# Patient Record
Sex: FEMALE | Race: WHITE
[De-identification: names, ages, dates, MRNs, and addresses within clinical notes are randomized per-mention and may not be internally consistent; named-entity substitution may affect disease eponyms.]

---

## 2019-11-16 ENCOUNTER — HOSPITAL ENCOUNTER (INPATIENT)
Dept: HOSPITAL 97 - ER | Age: 64
LOS: 2 days | Discharge: HOME | DRG: 392 | End: 2019-11-18
Attending: FAMILY MEDICINE | Admitting: FAMILY MEDICINE
Payer: COMMERCIAL

## 2019-11-16 VITALS — BODY MASS INDEX: 24.7 KG/M2

## 2019-11-16 DIAGNOSIS — E78.5: ICD-10-CM

## 2019-11-16 DIAGNOSIS — K21.9: ICD-10-CM

## 2019-11-16 DIAGNOSIS — Z88.0: ICD-10-CM

## 2019-11-16 DIAGNOSIS — E03.9: ICD-10-CM

## 2019-11-16 DIAGNOSIS — R19.7: ICD-10-CM

## 2019-11-16 DIAGNOSIS — K57.92: Primary | ICD-10-CM

## 2019-11-16 LAB
ALBUMIN SERPL BCP-MCNC: 3.4 G/DL (ref 3.4–5)
ALP SERPL-CCNC: 73 U/L (ref 45–117)
ALT SERPL W P-5'-P-CCNC: 21 U/L (ref 12–78)
AST SERPL W P-5'-P-CCNC: 17 U/L (ref 15–37)
BUN BLD-MCNC: 12 MG/DL (ref 7–18)
GLUCOSE SERPLBLD-MCNC: 103 MG/DL (ref 74–106)
HCT VFR BLD CALC: 38.9 % (ref 36–45)
INR BLD: 1.11
LIPASE SERPL-CCNC: 111 U/L (ref 73–393)
LYMPHOCYTES # SPEC AUTO: 1.3 K/UL (ref 0.7–4.9)
MAGNESIUM SERPL-MCNC: 2.2 MG/DL (ref 1.8–2.4)
NT-PROBNP SERPL-MCNC: 57 PG/ML (ref ?–125)
PMV BLD: 10 FL (ref 7.6–11.3)
POTASSIUM SERPL-SCNC: 3.5 MMOL/L (ref 3.5–5.1)
RBC # BLD: 4.28 M/UL (ref 3.86–4.86)
TROPONIN (EMERG DEPT USE ONLY): < 0.02 NG/ML (ref 0–0.04)

## 2019-11-16 PROCEDURE — 84100 ASSAY OF PHOSPHORUS: CPT

## 2019-11-16 PROCEDURE — 96375 TX/PRO/DX INJ NEW DRUG ADDON: CPT

## 2019-11-16 PROCEDURE — 85610 PROTHROMBIN TIME: CPT

## 2019-11-16 PROCEDURE — 93005 ELECTROCARDIOGRAM TRACING: CPT

## 2019-11-16 PROCEDURE — 96361 HYDRATE IV INFUSION ADD-ON: CPT

## 2019-11-16 PROCEDURE — 83880 ASSAY OF NATRIURETIC PEPTIDE: CPT

## 2019-11-16 PROCEDURE — 87086 URINE CULTURE/COLONY COUNT: CPT

## 2019-11-16 PROCEDURE — 71045 X-RAY EXAM CHEST 1 VIEW: CPT

## 2019-11-16 PROCEDURE — 80076 HEPATIC FUNCTION PANEL: CPT

## 2019-11-16 PROCEDURE — 80053 COMPREHEN METABOLIC PANEL: CPT

## 2019-11-16 PROCEDURE — 80048 BASIC METABOLIC PNL TOTAL CA: CPT

## 2019-11-16 PROCEDURE — 85025 COMPLETE CBC W/AUTO DIFF WBC: CPT

## 2019-11-16 PROCEDURE — 83690 ASSAY OF LIPASE: CPT

## 2019-11-16 PROCEDURE — 74177 CT ABD & PELVIS W/CONTRAST: CPT

## 2019-11-16 PROCEDURE — 87088 URINE BACTERIA CULTURE: CPT

## 2019-11-16 PROCEDURE — 84484 ASSAY OF TROPONIN QUANT: CPT

## 2019-11-16 PROCEDURE — 81003 URINALYSIS AUTO W/O SCOPE: CPT

## 2019-11-16 PROCEDURE — 96365 THER/PROPH/DIAG IV INF INIT: CPT

## 2019-11-16 PROCEDURE — 36415 COLL VENOUS BLD VENIPUNCTURE: CPT

## 2019-11-16 PROCEDURE — 83735 ASSAY OF MAGNESIUM: CPT

## 2019-11-16 PROCEDURE — 99285 EMERGENCY DEPT VISIT HI MDM: CPT

## 2019-11-16 PROCEDURE — 94760 N-INVAS EAR/PLS OXIMETRY 1: CPT

## 2019-11-16 PROCEDURE — 96367 TX/PROPH/DG ADDL SEQ IV INF: CPT

## 2019-11-16 RX ADMIN — METRONIDAZOLE SCH: 500 INJECTION, SOLUTION INTRAVENOUS at 11:02

## 2019-11-16 RX ADMIN — LEVOFLOXACIN SCH MLS: 5 INJECTION, SOLUTION INTRAVENOUS at 13:09

## 2019-11-16 RX ADMIN — METRONIDAZOLE SCH MLS: 500 INJECTION, SOLUTION INTRAVENOUS at 16:57

## 2019-11-16 RX ADMIN — Medication SCH: at 20:46

## 2019-11-16 RX ADMIN — SODIUM CHLORIDE SCH MLS: 0.9 INJECTION, SOLUTION INTRAVENOUS at 16:57

## 2019-11-16 RX ADMIN — SODIUM CHLORIDE SCH MLS: 0.9 INJECTION, SOLUTION INTRAVENOUS at 12:39

## 2019-11-16 RX ADMIN — HYDROCODONE BITARTRATE AND ACETAMINOPHEN PRN TAB: 5; 325 TABLET ORAL at 20:44

## 2019-11-16 RX ADMIN — HYDROCODONE BITARTRATE AND ACETAMINOPHEN PRN TAB: 5; 325 TABLET ORAL at 12:38

## 2019-11-16 NOTE — EKG
Test Date:    2019-11-16               Test Time:    07:52:57

Technician:   YAMILETH                                    

                                                     

MEASUREMENT RESULTS:                                       

Intervals:                                           

Rate:         71                                     

MN:           168                                    

QRSD:         70                                     

QT:           386                                    

QTc:          419                                    

Axis:                                                

P:            64                                     

MN:           168                                    

QRS:          45                                     

T:            48                                     

                                                     

INTERPRETIVE STATEMENTS:                                       

                                                     

Normal sinus rhythm

Low voltage QRS

Borderline ECG

Compared to ECG 09/04/2015 10:07:51

Low QRS voltage now present



Electronically Signed On 11-16-19 17:21:31 CST by Trung Ortega

## 2019-11-16 NOTE — EDPHYS
Physician Documentation                                                                           

 [facility]                                                                 

Name: Sammi Brtio                                                                                 

Age: 64 yrs                                                                                       

Sex: Female                                                                                       

: 1955                                                                                   

MRN: [id number]                                                                                   

Arrival Date: 2019                                                                          

Time: 06:59                                                                                       

Account#: [id number]                                                                            

Bed 5                                                                                             

Private MD:                                                                                       

ED Physician Ermias Pitts                                                                      

HPI:                                                                                              

                                                                                             

07:41 This 64 yrs old  Female presents to ER via Ambulatory with complaints of       rhett 

      Abdominal Pain.                                                                             

07:41 The patient presents with abdominal pain in the epigastric area, in the upper abdomen.  rhett 

      Onset: The symptoms/episode began/occurred 3 day(s) ago. The symptoms do not radiate.       

      Associated signs and symptoms: none. Modifying factors: The symptoms are alleviated by      

      supine position, the symptoms are aggravated by drinking, touching the area. Severity       

      of pain: At its worst the pain was moderate in the emergency department the pain is         

      unchanged. The patient has experienced similar episodes in the past, chronically.           

                                                                                                  

Historical:                                                                                       

- Allergies:                                                                                      

07:20 PENICILLINS;                                                                            sv  

- Home Meds:                                                                                      

07:20 levothyroxine oral [Active]; Simvastatin Oral [Active];                                 sv  

- PMHx:                                                                                           

07:20 High Cholesterol; Thyroid problem;                                                      sv  

- PSHx:                                                                                           

07:20 Appendectomy; Hysterectomy; Neck; Brain;                                                sv  

                                                                                                  

- Immunization history:: Adult Immunizations up to date.                                          

- Social history:: Smoking status: Patient/guardian denies using tobacco.                         

- Family history:: not pertinent.                                                                 

- Ebola Screening: : No symptoms or risks identified at this time.                                

                                                                                                  

                                                                                                  

ROS:                                                                                              

07:41 Constitutional: Negative for fever, chills, and weight loss, Eyes: Negative for injury, rhett 

      pain, redness, and discharge, ENT: Negative for injury, pain, and discharge, Neck:          

      Negative for injury, pain, and swelling, Cardiovascular: Negative for chest pain,           

      palpitations, and edema, Respiratory: Negative for shortness of breath, cough,              

      wheezing, and pleuritic chest pain, Back: Negative for injury and pain, : Negative        

      for injury, bleeding, discharge, and swelling, MS/Extremity: Negative for injury and        

      deformity, Skin: Negative for injury, rash, and discoloration, Neuro: Negative for          

      headache, weakness, numbness, tingling, and seizure, Psych: Negative for depression,        

      anxiety, suicide ideation, homicidal ideation, and hallucinations, Allergy/Immunology:      

      Negative for hives, rash, and allergies, Endocrine: Negative for neck swelling,             

      polydipsia, polyuria, polyphagia, and marked weight changes, Hematologic/Lymphatic:         

      Negative for swollen nodes, abnormal bleeding, and unusual bruising.                        

07:41 Abdomen/GI: Positive for abdominal pain, abdominal cramps, of the left upper quadrant,      

      right lower quadrant and left lower quadrant.                                               

                                                                                                  

Exam:                                                                                             

07:41 Constitutional:  This is a well developed, well nourished patient who is awake, alert,  rhett 

      and in no acute distress. Head/Face:  Normocephalic, atraumatic. Eyes:  Pupils equal        

      round and reactive to light, extra-ocular motions intact.  Lids and lashes normal.          

      Conjunctiva and sclera are non-icteric and not injected.  Cornea within normal limits.      

      Periorbital areas with no swelling, redness, or edema. ENT:  Nares patent. No nasal         

      discharge, no septal abnormalities noted.  Tympanic membranes are normal and external       

      auditory canals are clear.  Oropharynx with no redness, swelling, or masses, exudates,      

      or evidence of obstruction, uvula midline.  Mucous membranes moist. Neck:  Trachea          

      midline, no thyromegaly or masses palpated, and no cervical lymphadenopathy.  Supple,       

      full range of motion without nuchal rigidity, or vertebral point tenderness.  No            

      Meningismus. Chest/axilla:  Normal chest wall appearance and motion.  Nontender with no     

      deformity.  No lesions are appreciated. Cardiovascular:  Regular rate and rhythm with a     

      normal S1 and S2.  No gallops, murmurs, or rubs.  Normal PMI, no JVD.  No pulse             

      deficits. Respiratory:  Lungs have equal breath sounds bilaterally, clear to                

      auscultation and percussion.  No rales, rhonchi or wheezes noted.  No increased work of     

      breathing, no retractions or nasal flaring. Back:  No spinal tenderness.  No                

      costovertebral tenderness.  Full range of motion. Skin:  Warm, dry with normal turgor.      

      Normal color with no rashes, no lesions, and no evidence of cellulitis. MS/ Extremity:      

      Pulses equal, no cyanosis.  Neurovascular intact.  Full, normal range of motion. Neuro:     

       Awake and alert, GCS 15, oriented to person, place, time, and situation.  Cranial          

      nerves II-XII grossly intact.  Motor strength 5/5 in all extremities.  Sensory grossly      

      intact.  Cerebellar exam normal.  Normal gait. Psych:  Awake, alert, with orientation       

      to person, place and time.  Behavior, mood, and affect are within normal limits.            

07:41 Abdomen/GI: Inspection: distension, that is mild, Bowel sounds: normal, Palpation:          

      moderate abdominal tenderness, in the left upper quadrant, right lower quadrant and         

      left lower quadrant, Liver: no appreciated palpable abnormalities, Hernia: not              

      appreciated.                                                                                

                                                                                                  

Vital Signs:                                                                                      

07:20  / 85; Pulse 91; Resp 18; Temp 98.4; Pulse Ox 99% ; Weight 63.5 kg; Height 5 ft.  sv  

      3 in. (160.02 cm); Pain 9/10;                                                               

08:09  / 79; Pulse 83; Resp 16; Pulse Ox 99% ;                                          sv  

08:52  / 72; Pulse 69; Resp 16; Pulse Ox 99% ;                                          sv  

09:30  / 73; Pulse 73; Resp 14; Pulse Ox 96% ;                                          sv  

10:32  / 70; Pulse 77; Resp 16; Pulse Ox 98% ;                                          sv  

11:25  / 66; Pulse 73; Resp 16; Pulse Ox 100% ;                                         sv  

07:20 Body Mass Index 24.80 (63.50 kg, 160.02 cm)                                             sv  

                                                                                                  

MDM:                                                                                              

07:20 Patient medically screened.                                                             [facility] 

07:45 Data reviewed: vital signs, nurses notes, lab test result(s), EKG, radiologic studies,  [facility] 

      CT scan, plain films.                                                                       

                                                                                                  

                                                                                             

07:41 Order name: Basic Metabolic Panel; Complete Time: 08:33                                 [facility] 

                                                                                             

07:41 Order name: CBC with Diff; Complete Time: 08:33                                         [facility] 

                                                                                             

07:41 Order name: LFT's; Complete Time: 08:33                                                 [facility] 

                                                                                             

07:41 Order name: Magnesium; Complete Time: 08:33                                             [facility] 

                                                                                             

07:41 Order name: NT PRO-BNP; Complete Time: 08:33                                            [facility] 

                                                                                             

07:41 Order name: PT-INR; Complete Time: 08:33                                                [facility] 

                                                                                             

07:41 Order name: Troponin (emerg Dept Use Only); Complete Time: 08:33                        [facility] 

                                                                                             

07:41 Order name: XRAY Chest (1 view)                                                         [facility] 

                                                                                             

07:41 Order name: Lipase; Complete Time: 08:33                                                [facility] 

                                                                                             

07:41 Order name: CT Abd/Pelvis - PO and IV Contrast; Complete Time: 10:20                    [facility] 

                                                                                             

09:04 Order name: Urine Culture                                                               [facility] 

                                                                                             

09:04 Order name: Urine Culture                                                               [facility]

                                                                                             

09:09 Order name: Urine Dipstick--Ancillary (enter results); Complete Time: 10:20               

                                                                                             

07:41 Order name: EKG; Complete Time: 07:42                                                   [facility] 

                                                                                             

07:41 Order name: Cardiac monitoring; Complete Time: 07:43                                    [facility] 

                                                                                             

07:41 Order name: EKG - Nurse/Tech; Complete Time: 07:58                                      [facility] 

                                                                                             

07:41 Order name: IV Saline Lock; Complete Time: 07:43                                        [facility] 

                                                                                             

07:41 Order name: Labs collected and sent; Complete Time: 07:43                               [facility] 

                                                                                             

07:41 Order name: O2 Per Protocol; Complete Time: 07:43                                       [facility] 

                                                                                             

07:41 Order name: O2 Sat Monitoring; Complete Time: 07:43                                     [facility] 

                                                                                             

11:03 Order name: CONS Physician Consult; Complete Time: 11:17                                [facility]

                                                                                             

11:03 Order name: Full Liquid; Complete Time: 11:17                                           [facility]

                                                                                             

09:04 Order name: Urine Dipstick-Ancillary (obtain specimen); Complete Time: 09:18            [facility] 

                                                                                                  

Administered Medications:                                                                         

07:50 Drug: NS 0.9% 500 ml Route: IV; Rate: bolus; Site: right antecubital;                   sv  

08:30 Follow up: Response: No adverse reaction; IV Status: Completed infusion; IV Intake:     sv  

      500ml                                                                                       

07:50 Drug: Zofran 4 mg Route: IVP; Site: right antecubital;                                  sv  

08:30 Follow up: Response: No adverse reaction; Marked relief of symptoms                     sv  

07:52 Drug: Pepcid 20 mg Route: IVP; Infused Over: 2 mins; Site: right antecubital;           sv  

08:30 Follow up: Response: No adverse reaction; Marked relief of symptoms                     sv  

07:54 Drug: morphine 2 mg Route: IVP; Infused Over: 2 mins; Site: right antecubital;          sv  

11:17 Follow up: Response: No adverse reaction; Marked relief of symptoms; Pain is decreased; sv  

      RASS: Alert and Calm (0)                                                                    

08:30 Drug: NS 0.9% 1000 ml Route: IV; Rate: 125 ml/hr; Site: right antecubital;              sv  

11:30 Follow up: Response: No adverse reaction; IV Status: Infusion continued upon admission  sv  

08:30 Drug: Flagyl 500 mg Volume: 100 ml; Route: IVPB; Rate: 200 ml/hr; Infused Over: 30      sv  

      mins; Site: right antecubital;                                                              

09:34 Follow up: Response: No adverse reaction; IV Status: Completed infusion; IV Intake:     sv  

      100ml                                                                                       

09:35 Drug: Cipro 400 mg Volume: 200 ml; Route: IVPB; Infused Over: 60 mins; Site: right      sv  

      antecubital;                                                                                

11:00 Follow up: Response: No adverse reaction; IV Status: Completed infusion; IV Intake:     sv  

      200ml                                                                                       

11:08 Drug: Rocephin 2 grams Route: IV; Rate: per protocol; Infused Over: 4 mins; Site: right sv  

      antecubital;                                                                                

11:12 Follow up: Response: No adverse reaction; IV Status: Completed infusion; IV Intake: 20mlsv  

                                                                                                  

                                                                                                  

Disposition:                                                                                      

19 10:30 Hospitalization ordered by Russel Martin for Inpatient Admission. Preliminary     

  diagnosis are Abdominal tenderness, Diverticular disease of intestine,                          

  Diverticulitis of large intestine without perforation or abscess without                        

  bleeding - sigmoid.                                                                             

- Bed requested for Telemetry/MedSurg (Inpatient).                                                

- Status is Inpatient Admission.                                                              sv  

- Condition is Stable.                                                                            

- Problem is new.                                                                                 

- Symptoms have improved.                                                                         

UTI on Admission? No                                                                              

                                                                                                  

                                                                                                  

                                                                                                  

Signatures:                                                                                       

Dispatcher [employer]                           Dorina Mayberry RN RN sv Anderson, Corey, MD MD cha Botello, Elizabeth eb                                                   

                                                                                                  

Corrections: (The following items were deleted from the chart)                                    

10:34 10:30 Hospitalization Ordered by Russel Martin MD for Inpatient Admission. Preliminary  eb  

      diagnosis is Abdominal tenderness; Diverticular disease of intestine; Diverticulitis of     

      large intestine without perforation or abscess without bleeding - sigmoid. Bed              

      requested for Telemetry/MedSurg (Inpatient). Status is Inpatient Admission. Condition       

      is Stable. Problem is new. Symptoms have improved. UTI on Admission? No. rhett                

11:21 10:34 2019 10:30 Hospitalization Ordered by Russel Martin MD for Inpatient        eb  

      Admission. Preliminary diagnosis is Abdominal tenderness; Diverticular disease of           

      intestine; Diverticulitis of large intestine without perforation or abscess without         

      bleeding - sigmoid. Bed requested for Telemetry/MedSurg (Inpatient). Status is              

      Inpatient Admission. Condition is Stable. Problem is new. Symptoms have improved. UTI       

      on Admission? No. fabiana                                                                        

11:38 11:21 2019 10:30 Hospitalization Ordered by Russel Martin MD for Inpatient        sv  

      Admission. Preliminary diagnosis is Abdominal tenderness; Diverticular disease of           

      intestine; Diverticulitis of large intestine without perforation or abscess without         

      bleeding - sigmoid. Bed requested for Telemetry/MedSurg (Inpatient). Status is              

      Inpatient Admission. Condition is Stable. Problem is new. Symptoms have improved. UTI       

      on Admission? No. eb                                                                        

                                                                                                  

**************************************************************************************************

## 2019-11-16 NOTE — RAD REPORT
EXAM DESCRIPTION:  CTAbdomen   Pelvis W Contrast - 11/16/2019 9:48 am

 

CLINICAL HISTORY:  Abdominal pain.

ABD PAIN

 

COMPARISON:  CT ABD PELVIS W CONTRAST dated 5/21/2015

 

TECHNIQUE:  Biphasic CT imaging of the abdomen and pelvis was performed with 100 ml non-ionic IV cont
rast.

 

All CT scans are performed using dose optimization technique as appropriate and may include automated
 exposure control or mA/KV adjustment according to patient size.

 

FINDINGS:  The lung bases are clear.

 

The liver, spleen, pancreas, adrenal glands and kidneys are within normal limits.

 

No bowel obstruction, free air, free fluid or abscess.  Moderate inflammation is seen surrounding a s
hort segment of the sigmoid colon in the left lower quadrant most likely representing acute diverticu
litis. No abscess.  No evidence of significant lymphadenopathy.

 

No suspicious bony findings.

 

IMPRESSION:  Moderate short-segment acute sigmoid diverticulitis in the left lower quadrant without a
bscess suspected. Colonoscopy would be advised after appropriate therapy to exclude the possibility o
f underlying inflammatory mass given the focal nature of the findings.

## 2019-11-16 NOTE — ER
Nurse's Notes                                                                                     

 [facility]                                                                 

Name: Sammi Brito                                                                                 

Age: 64 yrs                                                                                       

Sex: Female                                                                                       

: 1955                                                                                   

MRN: [id number]                                                                                   

Arrival Date: 2019                                                                          

Time: 06:59                                                                                       

Account#: [id number]                                                                            

Bed 5                                                                                             

Private MD:                                                                                       

Diagnosis: Abdominal tenderness;Diverticular disease of intestine;Diverticulitis of large         

  intestine without perforation or abscess without bleeding-sigmoid                               

                                                                                                  

Presentation:                                                                                     

                                                                                             

07:09 Presenting complaint: Patient states: epigastric pain since 2019 but reports     sv  

      since Wed pain has increased and now radiates to the lower abd with nausea; rest            

      alleviates pain. Has seen Dr Rod within this last week and saw Dr Jauregui yesterday.       

      Reports fever Tmax 101, denies v/d/constipation. Transition of care: patient was not        

      received from another setting of care. Onset of symptoms was 2019. Risk              

      Assessment: Do you want to hurt yourself or someone else? Patient reports no desire to      

      harm self or others. Initial Sepsis Screen: Does the patient meet any 2 criteria? No.       

      Patient's initial sepsis screen is negative. Does the patient have a suspected source       

      of infection? No. Patient's initial sepsis screen is negative. Care prior to arrival:       

      None.                                                                                       

07:09 Method Of Arrival: Ambulatory                                                           sv  

07:09 Acuity: EDI 3                                                                           sv  

                                                                                                  

Triage Assessment:                                                                                

07:10 General: Appears in no apparent distress. uncomfortable, well groomed, well developed,  sv  

      Behavior is calm, cooperative, appropriate for age. Pain: Complains of pain in              

      epigastric area, umbilical area and suprapubic area Pain currently is 9 out of 10 on a      

      pain scale. Pain began since August but has increased in pain since Wed Is                  

      intermittent, Alleviated by rest, Also complains of nausea. Neuro: Level of                 

      Consciousness is awake, alert, obeys commands, Oriented to person, place, time,             

      situation, Moves all extremities. Full function Gait is steady, Speech is normal.           

      Respiratory: Respiratory effort is even, unlabored, Respiratory pattern is regular. GI:     

      Abdomen is flat, Abd is soft and non tender X 4 quads. Abd is non tender in epigastric      

      area, umbilical area and suprapubic area Reports nausea, Patient currently denies           

      constipation, diarrhea, vomiting. Derm: Skin is pink, warm \T\ dry.                         

                                                                                                  

Historical:                                                                                       

- Allergies:                                                                                      

07:20 PENICILLINS;                                                                            sv  

- Home Meds:                                                                                      

07:20 levothyroxine oral [Active]; Simvastatin Oral [Active];                                 sv  

- PMHx:                                                                                           

07:20 High Cholesterol; Thyroid problem;                                                      sv  

- PSHx:                                                                                           

07:20 Appendectomy; Hysterectomy; Neck; Brain;                                                sv  

                                                                                                  

- Immunization history:: Adult Immunizations up to date.                                          

- Social history:: Smoking status: Patient/guardian denies using tobacco.                         

- Family history:: not pertinent.                                                                 

- Ebola Screening: : No symptoms or risks identified at this time.                                

                                                                                                  

                                                                                                  

Screenin:20 Abuse screen: Denies threats or abuse. Abuse screen: Denies injuries from another.      sv  

      Nutritional screening: No deficits noted. Tuberculosis screening: No symptoms or risk       

      factors identified. Fall Risk None identified.                                              

                                                                                                  

Assessment:                                                                                       

07:50 Reassessment: Patient appears in no apparent distress at this time. No changes from       

      previously documented assessment. Patient and/or family updated on plan of care and         

      expected duration. Pain level reassessed. Patient is alert, oriented x 3, equal             

      unlabored respirations, skin warm/dry/pink.                                                 

08:10 Reassessment: Informed Shirlene in CT, pt finished contrast at this time.                  sv  

08:30 Reassessment: Patient appears in no apparent distress at this time. Patient and/or      sv  

      family updated on plan of care and expected duration. Pain level reassessed. Patient is     

      alert, oriented x 3, equal unlabored respirations, skin warm/dry/pink. Patient states       

      feeling better. Patient states symptoms have improved.                                      

09:35 Reassessment: Patient appears in no apparent distress at this time. Patient and/or      sv  

      family updated on plan of care and expected duration. Pain level reassessed. Patient is     

      alert, oriented x 3, equal unlabored respirations, skin warm/dry/pink. Patient states       

      feeling better. Patient states symptoms have improved.                                      

11:08 Reassessment: Patient appears in no apparent distress at this time. Patient and/or      sv  

      family updated on plan of care and expected duration. Pain level reassessed. Patient is     

      alert, oriented x 3, equal unlabored respirations, skin warm/dry/pink.                      

11:10 Reassessment: Pt requesting water, ok by Dr Pitts.                                   sv  

11:29 Reassessment: Patient appears in no apparent distress at this time. Patient and/or      sv  

      family updated on plan of care and expected duration. Pain level reassessed. Patient is     

      alert, oriented x 3, equal unlabored respirations, skin warm/dry/pink.                      

                                                                                                  

Vital Signs:                                                                                      

07:20  / 85; Pulse 91; Resp 18; Temp 98.4; Pulse Ox 99% ; Weight 63.5 kg; Height 5 ft.  sv  

      3 in. (160.02 cm); Pain 9/10;                                                               

08:09  / 79; Pulse 83; Resp 16; Pulse Ox 99% ;                                          sv  

08:52  / 72; Pulse 69; Resp 16; Pulse Ox 99% ;                                          sv  

09:30  / 73; Pulse 73; Resp 14; Pulse Ox 96% ;                                          sv  

10:32  / 70; Pulse 77; Resp 16; Pulse Ox 98% ;                                          sv  

11:25  / 66; Pulse 73; Resp 16; Pulse Ox 100% ;                                         sv  

07:20 Body Mass Index 24.80 (63.50 kg, 160.02 cm)                                             sv  

                                                                                                  

ED Course:                                                                                        

06:59 Patient arrived in ED.                                                                  ds1 

07:15 Dorina Love, RN is Primary Nurse.                                                  sv  

07:19 Triage completed.                                                                       sv  

07:20 Ermias Pitts MD is Attending Physician.                                             rhett 

07:20 Arm band placed on.                                                                     sv  

07:20 Patient has correct armband on for positive identification. Placed in gown. Bed in low  sv  

      position. Call light in reach. Adult w/ patient. Cardiac monitor on. Pulse ox on. NIBP      

      on. Door closed. Warm blanket given. Head of bed elevated.                                  

07:25 Inserted saline lock: 20 gauge in right antecubital area, using aseptic technique.      sv  

      Blood collected. Flushed right antecubital with 5 ml normal saline.                         

07:31 ED physician to see patient.                                                            sv  

07:58 EKG done, by ED staff, reviewed by Ermias Pitts MD.                                   em1 

08:08 X-ray(s) taken.                                                                         sv  

08:15 XRAY Chest (1 view) In Process Unspecified.                                             EDMS

08:15 Awaiting lab results, Awaiting radiology results. Awaiting CT Scan.                     sv  

09:18 Urine Culture Sent.                                                                     sv  

09:38 Patient moved to CT via wheelchair.                                                     sv  

09:46 CT completed. Patient tolerated procedure well. Patient moved back from CT.             mw3 

09:48 CT Abd/Pelvis - PO and IV Contrast In Process Unspecified.                              EDMS

10:28 Russel Martin MD is Hospitalizing Provider.                                           rhett 

10:40 Awaiting bed assignment.                                                                sv  

11:30 No provider procedures requiring assistance completed. Patient admitted, IV remains in  sv  

      place. intact.                                                                              

                                                                                                  

Administered Medications:                                                                         

07:50 Drug: NS 0.9% 500 ml Route: IV; Rate: bolus; Site: right antecubital;                   sv  

08:30 Follow up: Response: No adverse reaction; IV Status: Completed infusion; IV Intake:     sv  

      500ml                                                                                       

07:50 Drug: Zofran 4 mg Route: IVP; Site: right antecubital;                                  sv  

08:30 Follow up: Response: No adverse reaction; Marked relief of symptoms                     sv  

07:52 Drug: Pepcid 20 mg Route: IVP; Infused Over: 2 mins; Site: right antecubital;           sv  

08:30 Follow up: Response: No adverse reaction; Marked relief of symptoms                     sv  

07:54 Drug: morphine 2 mg Route: IVP; Infused Over: 2 mins; Site: right antecubital;          sv  

11:17 Follow up: Response: No adverse reaction; Marked relief of symptoms; Pain is decreased; sv  

      RASS: Alert and Calm (0)                                                                    

08:30 Drug: NS 0.9% 1000 ml Route: IV; Rate: 125 ml/hr; Site: right antecubital;              sv  

11:30 Follow up: Response: No adverse reaction; IV Status: Infusion continued upon admission  sv  

08:30 Drug: Flagyl 500 mg Volume: 100 ml; Route: IVPB; Rate: 200 ml/hr; Infused Over: 30      sv  

      mins; Site: right antecubital;                                                              

09:34 Follow up: Response: No adverse reaction; IV Status: Completed infusion; IV Intake:     sv  

      100ml                                                                                       

09:35 Drug: Cipro 400 mg Volume: 200 ml; Route: IVPB; Infused Over: 60 mins; Site: right      sv  

      antecubital;                                                                                

11:00 Follow up: Response: No adverse reaction; IV Status: Completed infusion; IV Intake:     sv  

      200ml                                                                                       

11:08 Drug: Rocephin 2 grams Route: IV; Rate: per protocol; Infused Over: 4 mins; Site: right sv  

      antecubital;                                                                                

11:12 Follow up: Response: No adverse reaction; IV Status: Completed infusion; IV Intake: 20mlsv  

                                                                                                  

                                                                                                  

Intake:                                                                                           

08:30 IV: 500ml; Total: 500ml.                                                                sv  

09:34 IV: 100ml; Total: 600ml.                                                                sv  

11:00 IV: 200ml; Total: 800ml.                                                                sv  

11:12 IV: 20ml; Total: 820ml.                                                                 sv  

                                                                                                  

Outcome:                                                                                          

10:30 Decision to Hospitalize by Provider.                                                    rhett 

11:29 Admitted to Med/surg accompanied by tech, family with patient, via wheelchair, room     sv  

      431, with chart, Report called to  Mahnaz SANTORO                                               

11:29 Condition: stable                                                                           

11:29 Instructed on the need for admit.                                                           

11:38 Patient left the ED.                                                                    sv  

                                                                                                  

Signatures:                                                                                       

Dispatcher [employer]                           Dorina Mayberry RN RN sv Anderson, Corey, MD MD cha Sanford, Radha [email or web address]                                                  

Deep, Omar [email or web address]                                                  

Vesna Stallworth                             mw3                                                  

                                                                                                  

Corrections: (The following items were deleted from the chart)                                    

11:17 08:30 Response: No adverse reaction; Marked relief of symptoms; Pain is decreased sv    sv  

                                                                                                  

**************************************************************************************************

## 2019-11-16 NOTE — RAD REPORT
EXAM DESCRIPTION:  RAD - Chest Single View - 11/16/2019 8:15 am

 

CLINICAL HISTORY:  ABDOMINAL DISTENTION

Chest pain.

 

COMPARISON:  No comparisons

 

FINDINGS:  Portable technique limits examination quality.

 

The lungs are grossly clear. The heart is normal in size. No displaced fractures.

 

IMPRESSION:  No acute intrathoracic process suspected.

## 2019-11-16 NOTE — P.HP
Certification for Inpatient


Patient admitted to: Inpatient


With expected LOS: >2 Midnights


Patient will require the following post-hospital care: None


Practitioner: I am a practitioner with admitting privileges, knowledge of 

patient current condition, hospital course, and medical plan of care.


Services: Services provided to patient in accordance with Admission 

requirements found in Title 42 Section 412.3 of the Code of Federal Regulations





Patient History


Date of Service: 11/16/19


Reason for admission: Abdominal Pain 


History of Present Illness: 





64-year-old  female with past medical history of  hyperlipidemia, 

hypothyroidism came to ER with abdominal pain which has been going on for last 

2 days and was progressively worsening.  She was seen with GI yesterday for the 

same complaint  but the pain was not improving and had subjective fever with 

chills and was brought to the ER .  Pain is located in the epigastrium and also 

left  lower quadrant.  associated with nausea but no vomiting.  sharp pain 6/10

  


in severity. denies any diarrhea or dysuria . 


 patient was assessed in the ER and was found to have vitals normal ,  the pain 

is this still present .  CT was consistent with sigmoid diverticulitis


 patient being admitted for further management and pain control .











Allergies





Penicillins Allergy (Verified 09/21/15 13:51)


 Rash





Home medications list reviewed: Yes





- Past Medical/Surgical History


Diabetic: No


Past Medical History: Reviewed- Non-Contributory


-: Hyperlipidemia


-: Hypothyroidism


Past Surgical History: Reviewed- Non-Contributory


-: Hysterectomy


-: Appendectomy


-: Brain tumor   acoustic neuroma


-: Neck surgery





- Family History


Family History: Reviewed- Non-Contributory





- Social History


Smoking Status: Never smoker


Alcohol use: Yes





Review of Systems


10-point ROS is otherwise unremarkable


General: Fever, Chills


Eyes: Unremarkable


ENT: Unremarkable


Respiratory: Unremarkable


Cardiovascular: Unremarkable


Gastrointestinal: Nausea, Abdominal Pain


Genitourinary: Unremarkable


Musculoskeletal: Unremarkable (All)


Integumentary: Unremarkable


Neurological: Unremarkable





Physical Examination





- Vital Signs


Temperature: 98.6 F


Blood Pressure: 122/78


Pulse: 76


Respirations: 18





- Physical Exam


General: Alert, Oriented x3, Cooperative


HEENT: Atraumatic, Normocephalic


Neck: Supple, 2+ carotid pulse no bruit


Respiratory: Clear to auscultation bilaterally, Normal air movement


Cardiovascular: Normal pulses, Regular rate/rhythm, Normal S1 S2


Capillary refill: <2 Seconds


Gastrointestinal: Non-distended, W/out hepatosplenomegaly, Tenderness


Musculoskeletal: No clubbing, No swelling


Integumentary: No rashes, No tenderness/swelling


Neurological: Normal gait, Normal speech, Normal strength at 5/5 x4 extr


Lymphatics: No axilla or inguinal lymphadenopathy


Urinary: Other


External genitalia: Deferred


Rectal: Deferred





- Studies


Laboratory Data (last 24 hrs)





11/16/19 07:25: PT 13.1 H, INR 1.11


11/16/19 07:25: WBC 11.2 H, Hgb 13.1, Hct 38.9, Plt Count 219


11/16/19 07:25: Sodium 141, Potassium 3.5, BUN 12, Creatinine 0.83, Glucose 103

, Magnesium 2.2, Total Bilirubin 0.5, AST 17, ALT 21, Alkaline Phosphatase 73, 

Lipase 111





Imagings Data: 





CT consistent with acute sigmoid diverticulitis 





Assessment and Plan





- Problems (Diagnosis)


(1) Diverticulitis


Current Visit: Yes   Status: Acute   


Plan: 





Acute sigmoid diverticulitis


Pain control


 start on IV antibiotics


Monitor closely 


GI consult


patient had colonoscopy 2 years back with no mention of diverticulosis 


Discussed about dietary modification











(2) GERD (gastroesophageal reflux disease)


Current Visit: Yes   Status: Acute   


Plan: 


Start on PPI


 monitor closely 


Qualifiers: 


   Esophagitis presence: esophagitis presence not specified   Qualified Code(s)

: K21.9 - Gastro-esophageal reflux disease without esophagitis   





(3) Hypothyroid


Current Visit: Yes   Status: Chronic   


Plan: 


 continue home medications and titrate as needed








(4) Hyperlipidemia


Current Visit: Yes   Status: Chronic   


Plan: 


Current home medications 





Discharge Plan: Home


Plan to discharge in: 48 Hours





- Advance Directives


Does patient have a Living Will: No


Does patient have a Durable POA for Healthcare: No


Physician Review: Patient Assessed, Agree with Above Assessment and Plan


Time Spent Managing Pts Care (In Minutes): 46

## 2019-11-17 VITALS — OXYGEN SATURATION: 99 %

## 2019-11-17 LAB
ALBUMIN SERPL BCP-MCNC: 2.6 G/DL (ref 3.4–5)
ALP SERPL-CCNC: 53 U/L (ref 45–117)
ALT SERPL W P-5'-P-CCNC: 14 U/L (ref 12–78)
AST SERPL W P-5'-P-CCNC: 11 U/L (ref 15–37)
BUN BLD-MCNC: 8 MG/DL (ref 7–18)
GLUCOSE SERPLBLD-MCNC: 94 MG/DL (ref 74–106)
HCT VFR BLD CALC: 31.6 % (ref 36–45)
LYMPHOCYTES # SPEC AUTO: 1.7 K/UL (ref 0.7–4.9)
MAGNESIUM SERPL-MCNC: 1.9 MG/DL (ref 1.8–2.4)
PMV BLD: 9.7 FL (ref 7.6–11.3)
POTASSIUM SERPL-SCNC: 3.9 MMOL/L (ref 3.5–5.1)
RBC # BLD: 3.46 M/UL (ref 3.86–4.86)

## 2019-11-17 RX ADMIN — LEVOFLOXACIN SCH MLS: 5 INJECTION, SOLUTION INTRAVENOUS at 12:39

## 2019-11-17 RX ADMIN — METRONIDAZOLE SCH MLS: 500 INJECTION, SOLUTION INTRAVENOUS at 00:46

## 2019-11-17 RX ADMIN — SODIUM CHLORIDE SCH: 0.9 INJECTION, SOLUTION INTRAVENOUS at 07:00

## 2019-11-17 RX ADMIN — METRONIDAZOLE SCH MLS: 500 INJECTION, SOLUTION INTRAVENOUS at 17:43

## 2019-11-17 RX ADMIN — SODIUM CHLORIDE SCH MLS: 0.9 INJECTION, SOLUTION INTRAVENOUS at 20:27

## 2019-11-17 RX ADMIN — METRONIDAZOLE SCH MLS: 500 INJECTION, SOLUTION INTRAVENOUS at 08:04

## 2019-11-17 RX ADMIN — Medication SCH: at 20:28

## 2019-11-17 RX ADMIN — Medication SCH ML: at 08:06

## 2019-11-17 RX ADMIN — SODIUM CHLORIDE SCH MLS: 0.9 INJECTION, SOLUTION INTRAVENOUS at 05:37

## 2019-11-17 NOTE — P.PN
Subjective


Date of Service: 11/17/19


Chief Complaint: Abdominal Pain 


Subjective: No new changes, Tolerating diet, Improving





Pain is controlled well


Complaints of diarrhea


I





Review of Systems


10-point ROS is otherwise unremarkable


Gastrointestinal: Diarrhea





Physical Examination





- Vital Signs


Temperature: 97.8 F


Blood Pressure: 125/77


Pulse: 65


Respirations: 16


Pulse Ox (%): 98





- Physical Exam


General: Alert, In no apparent distress


HEENT: Atraumatic, Normocephalic


Neck: Supple, 2+ carotid pulse no bruit


Respiratory: Clear to auscultation bilaterally, Normal air movement


Cardiovascular: Regular rate/rhythm, Normal S1 S2


Capillary refill: <2 Seconds


Gastrointestinal: Soft and benign, W/out hepatosplenomegaly


Musculoskeletal: No swelling


Integumentary: No rashes


Neurological: Normal strength at 5/5 x4 extr


Lymphatics: No axilla or inguinal lymphadenopathy


Urinary: Other (No bladder distention)


External genitalia: Deferred


Rectal: Deferred





Assessment & Plan





- Problems (Diagnosis)


(1) Diverticulitis


Current Visit: Yes   Status: Acute   


Plan: 





Acute sigmoid diverticulitis


Pain control


on IV antibiotics


Monitor closely 


GI consult


patient had colonoscopy 2 years back with no mention of diverticulosis 


Discussed about dietary modification











(2) GERD (gastroesophageal reflux disease)


Current Visit: Yes   Status: Acute   


Plan: 


 on PPI


 monitor closely 


Qualifiers: 


   Esophagitis presence: esophagitis presence not specified   Qualified Code(s)

: K21.9 - Gastro-esophageal reflux disease without esophagitis   





(3) Hypothyroid


Current Visit: Yes   Status: Chronic   


Plan: 


 continue home medications and titrate as needed








(4) Hyperlipidemia


Current Visit: Yes   Status: Chronic   


Plan: 


Current home medications 








(5) Diarrhea


Current Visit: Yes   Status: Acute   


Plan: 


Acute   diarrheal episodes


Will start on lactobacillus


Denies any melena 


Will get a CDiff  if not better





Discharge Plan: Home


Plan to discharge in: 24 Hours


Physician Review: Patient Assessed, Agree with Above Assessment and Plan


Time Spent Managing Pts Care (In Minutes): 42

## 2019-11-18 VITALS — TEMPERATURE: 97.4 F | DIASTOLIC BLOOD PRESSURE: 75 MMHG | SYSTOLIC BLOOD PRESSURE: 143 MMHG

## 2019-11-18 RX ADMIN — METRONIDAZOLE SCH MLS: 500 INJECTION, SOLUTION INTRAVENOUS at 04:31

## 2019-11-18 NOTE — P.DS
Admission Date: 11/16/19


Discharge Date: 11/18/19


Disposition: ROUTINE DISCHARGE


Discharge Condition: GOOD


Reason for Admission: Abdominal Pain 





- Problems


(1) Diverticulitis


Status: Acute   





(2) GERD (gastroesophageal reflux disease)


Status: Acute   


Qualifiers: 


   Esophagitis presence: esophagitis presence not specified   Qualified Code(s)

: K21.9 - Gastro-esophageal reflux disease without esophagitis   





(3) Hypothyroid


Status: Chronic   





(4) Hyperlipidemia


Status: Chronic   





(5) Diarrhea


Status: Acute   


Brief History of Present Illness: 





64-year-old  female with past medical history of  hyperlipidemia, 

hypothyroidism came to ER with abdominal pain which has been going on for last 

2 days and was progressively worsening.  She was seen with GI yesterday for the 

same complaint  but the pain was not improving and had subjective fever with 

chills and was brought to the ER .  Pain is located in the epigastrium and also 

left  lower quadrant.  associated with nausea but no vomiting.  sharp pain 6/10

  


in severity. denies any diarrhea or dysuria . 


 patient was assessed in the ER and was found to have vitals normal ,  the pain 

is this still present .  CT was consistent with sigmoid diverticulitis


 patient being admitted for further management and pain control .











Hospital Course: 





Patient was admitted and was started on pain medications along with IV 

antibiotics.  CT was suggestive of  with acute sigmoid diverticulitis , she was 

also start on PPI for GERD .  She responded well to the treatment.   She was 

also having diarrhea for which lactobacillus was also added .  GI was 

consulted.  Dr Jauregui recommended to discharge the patient home in the morning 

and follow up with him in clinic this morning itself so that he can do her 

upper endoscope.


Discussed in detail with the patient and the family regarding the condition and 

follow up needed.  they verbalized understanding and is being discharged home 

today in a stable condition with advice to follow up with GI today .








Vital Signs/Physical Exam: 














Temp Pulse Resp BP Pulse Ox


 


 97.6 F   62   16   140/75   98 


 


 11/18/19 04:00  11/18/19 04:00  11/18/19 04:00  11/18/19 04:00  11/18/19 04:00








General: Alert, In no apparent distress


HEENT: Atraumatic, Normocephalic


Neck: Supple


Respiratory: Clear to auscultation bilaterally


Cardiovascular: Regular rate/rhythm, Normal S1 S2


Gastrointestinal: Soft and benign, W/out hepatosplenomegaly


Musculoskeletal: No clubbing


Integumentary: No rashes


Neurological: Normal speech, Normal strength at 5/5 x4 extr


Lymphatics: No axilla or inguinal lymphadenopathy


Laboratory Data at Discharge: 














WBC  6.7 K/uL (4.3-10.9)  D 11/17/19  05:45    


 


Hgb  11.1 g/dL (12.0-15.0)  L  11/17/19  05:45    


 


Hct  31.6 % (36.0-45.0)  L D 11/17/19  05:45    


 


Plt Count  194 K/uL (152-406)   11/17/19  05:45    


 


PT  13.1 SECONDS (9.5-12.5)  H  11/16/19  07:25    


 


INR  1.11   11/16/19  07:25    


 


Sodium  142 mmol/L (136-145)   11/17/19  05:45    


 


Potassium  3.9 mmol/L (3.5-5.1)   11/17/19  05:45    


 


BUN  8 mg/dL (7-18)   11/17/19  05:45    


 


Creatinine  0.75 mg/dL (0.55-1.3)   11/17/19  05:45    


 


Glucose  94 mg/dL ()   11/17/19  05:45    


 


Phosphorus  2.8 mg/dL (2.5-4.9)   11/17/19  05:45    


 


Magnesium  1.9 mg/dL (1.8-2.4)   11/17/19  05:45    


 


Total Bilirubin  0.2 mg/dL (0.2-1.0)   11/17/19  05:45    


 


AST  11 U/L (15-37)  L  11/17/19  05:45    


 


ALT  14 U/L (12-78)   11/17/19  05:45    


 


Alkaline Phosphatase  53 U/L ()   11/17/19  05:45    


 


Lipase  111 U/L ()   11/16/19  07:25    








Home Medications: 








Levothyroxine Sodium 75 mcg PO [id number] 11/16/19 


Simvastatin 20 mg PO BEDTIME 11/16/19 


Ciprofloxacin HCl [Cipro 500 MG Tablet] 500 mg PO BID #14 tab 11/17/19 


Pantoprazole [Protonix  Tab] 40 mg PO DAILY #30 tab 11/17/19 


metroNIDAZOLE [Flagyl] 500 mg PO Q8H #21 tablet 11/17/19 





New Medications: 


Ciprofloxacin HCl [Cipro 500 MG Tablet] 500 mg PO BID #14 tab


metroNIDAZOLE [Flagyl] 500 mg PO Q8H #21 tablet


Pantoprazole [Protonix  Tab] 40 mg PO DAILY #30 tab


Diet: Regular


Activity: Ad ariane


Followup: 


Markos Jauregui MD [Primary Care Provider] - 


Time spent managing pt's care (in minutes): 35

## 2019-12-23 ENCOUNTER — HOSPITAL ENCOUNTER (OUTPATIENT)
Dept: HOSPITAL 97 - OR | Age: 64
Discharge: HOME | End: 2019-12-23
Attending: SURGERY
Payer: COMMERCIAL

## 2019-12-23 VITALS — TEMPERATURE: 98.7 F

## 2019-12-23 DIAGNOSIS — R10.32: Primary | ICD-10-CM

## 2019-12-23 DIAGNOSIS — Z53.9: ICD-10-CM

## 2019-12-23 LAB
BUN BLD-MCNC: 16 MG/DL (ref 7–18)
GLUCOSE SERPLBLD-MCNC: 115 MG/DL (ref 74–106)
HCT VFR BLD CALC: 40.1 % (ref 36–45)
LYMPHOCYTES # SPEC AUTO: 0.7 K/UL (ref 0.7–4.9)
MAGNESIUM SERPL-MCNC: 2 MG/DL (ref 1.8–2.4)
MORPHOLOGY BLD-IMP: (no result)
PMV BLD: 9.1 FL (ref 7.6–11.3)
POTASSIUM SERPL-SCNC: 3.9 MMOL/L (ref 3.5–5.1)
RBC # BLD: 4.45 M/UL (ref 3.86–4.86)

## 2019-12-23 PROCEDURE — 83735 ASSAY OF MAGNESIUM: CPT

## 2019-12-23 PROCEDURE — 82565 ASSAY OF CREATININE: CPT

## 2019-12-23 PROCEDURE — 36415 COLL VENOUS BLD VENIPUNCTURE: CPT

## 2019-12-23 PROCEDURE — 84100 ASSAY OF PHOSPHORUS: CPT

## 2019-12-23 PROCEDURE — 80048 BASIC METABOLIC PNL TOTAL CA: CPT

## 2019-12-23 PROCEDURE — 74177 CT ABD & PELVIS W/CONTRAST: CPT

## 2019-12-23 PROCEDURE — 85025 COMPLETE CBC W/AUTO DIFF WBC: CPT

## 2019-12-23 NOTE — RAD REPORT
EXAM DESCRIPTION:  CT - Abdomen   Pelvis W Contrast - 12/23/2019 10:49 am

 

CLINICAL HISTORY:  LLQ ABDOMINAL PAIN

 

COMPARISON:  CT imaging November 16, 2019, CT imaging May 2015

 

TECHNIQUE:  Biphasic, helical CT imaging of the abdomen and pelvis was performed following 100 ml non
-ionic IV contrast. Oral contrast was given.

 

All CT scans are performed using dose optimization technique as appropriate and may include automated
 exposure control or mA/KV adjustment according to patient size.

 

FINDINGS:  No suspicious findings in the lung bases.

 

The liver, spleen, and pancreas show no suspicious findings. Gallbladder and biliary tree are also wi
thout suspicious finding.

 

Symmetric renal function is seen with no hydronephrosis or suspicious renal mass. No pyelonephritis o
r acute parenchymal process. Urinary bladder is partially contracted. This limits assessment. No blad
jose alfredo calculi seen. Uterus is absent. Left ovary is absent or atrophic. There is fullness of the right 
side of the vaginal cuff. This is potentially the right ovary. Scarring or thickening of the vaginal 
cuff would be possible as well. The 2015 study showed complex cyst in this region that has subsequent
ly resolved. This is likely the remnant tissue from that process. This is not regarded as significant
. No adrenal abnormalities.

 

No stomach or small bowel abnormality. Appendix is normal. Colon from cecum to mid descending colon s
hows no significant finding. Approximately 4-5 centimeter long segment of colon at the descending sig
moid junction shows circumferential wall thickening and nodularity. There is prominent diverticulosis
 in this region. There is an air in fluid collection along the serosal margin is believed to be still
 within the serosal boundary of the colon. Oral contrast has reached the rectum without extravasation
. There is stranding and edema in the adjacent fat. A few punctate lymph nodes are also evident.

 

This is the same location seen on the November 15 imaging. No evidence for improvement. This could be
 persistent diverticulitis. Recurrent disease is possible. Malignant mass is possible as well given t
he absence of a prove min over a 5 week interval.

 

No free air or pneumatosis present.  No hernia, mass or bulky lymphadenopathy.

 

No suspicious bony findings.

 

 

IMPRESSION:  The irregular circumferential wall thickening at the descending colon -sigmoid colon tessy
ction has show no improvement from November 16.

 

No abscess, free air or complicating finding since the mid November study.

 

Findings could represent recurrent or persistent diverticulitis. Colon malignancy is certainly a poss
ibility given the absence of any improvement over a 5 week interval.

## 2021-06-05 ENCOUNTER — HOSPITAL ENCOUNTER (EMERGENCY)
Dept: HOSPITAL 97 - ER | Age: 66
Discharge: HOME | End: 2021-06-05
Payer: COMMERCIAL

## 2021-06-05 VITALS — DIASTOLIC BLOOD PRESSURE: 72 MMHG | SYSTOLIC BLOOD PRESSURE: 149 MMHG

## 2021-06-05 VITALS — OXYGEN SATURATION: 100 %

## 2021-06-05 VITALS — TEMPERATURE: 97.4 F

## 2021-06-05 DIAGNOSIS — Z88.0: ICD-10-CM

## 2021-06-05 DIAGNOSIS — D33.3: ICD-10-CM

## 2021-06-05 DIAGNOSIS — E78.00: ICD-10-CM

## 2021-06-05 DIAGNOSIS — E07.9: ICD-10-CM

## 2021-06-05 DIAGNOSIS — H90.8: Primary | ICD-10-CM

## 2021-06-05 PROCEDURE — 99283 EMERGENCY DEPT VISIT LOW MDM: CPT

## 2021-06-05 PROCEDURE — 70480 CT ORBIT/EAR/FOSSA W/O DYE: CPT

## 2021-06-05 NOTE — XMS REPORT
Continuity of Care Document

                             Created on:2021



Patient:ABBY MCCOY

Sex:Female

:1955

External Reference #:[id number]





Demographics







                          Address                   [address] [place] [address] [address]



                                                    [place] [address]

 

                          Home Phone                [phone]

 

                          Work Phone                [phone]

 

                          Mobile Phone              [phone]

 

                          Email Address             [email or web address]

 

                          Preferred Language        English

 

                          Marital Status            Unknown

 

                          Caodaism Affiliation     Unknown

 

                          Race                      Unknown

 

                          Additional Race(s)        Unavailable



                                                    White

 

                          Ethnic Group              Unknown









Author







                          Organization              [facility]

t

 

                          Address                   [address] [place] [address] [address]



                                                    [place] [address]

 

                          Phone                     [phone]









Support







                Name            Relationship    Address         Phone

 

                DARYL           Spouse          [address]      [phone]



                                                [place] [address] 

 

                DARYL           Unavailable     [address]      [phone]



                                                [place] [address] 

 

                Daryl           Spouse          [address]      [phone]



                                                [place] [address] 









Care Team Providers







                    Name                Role                Phone

 

                    Viola Carrasco MD       Primary Care Physician [phone]

 

                    Jackeline BERMUDEZ,  P.      Attending Clinician [phone]

 

                    Nhan BERMUDEZ,  C Attending Clinician [phone]

 

                    Pob,  Lab Main      Attending Clinician Unavailable

 

                    Rocio BERMUDEZ,  M.      Attending Clinician [phone]

 

                    SUPA             Attending Clinician Unavailable

 

                    REJI                Attending Clinician Unavailable

 

                    SUPA             Admitting Clinician Unavailable









Payers







           Payer Name Policy Type Policy     Effective Date Expiration Date Sour

ce



                                 Number                           

 

           MEDICAREMEDICARE PART            [id number] 2021            [place] [employer]                            00:00:00              [employer]



           [id number]/2021-                                             



           [place]            [id number]  2021            [place]



           [facility]                       00:00:00              [facility]

t



           MEDICARExxxxx90063/2021-Present                                             







Problems







       Condition Condition Condition Status Onset  Resolution Last   Treating Co

mments 

Source



       Name   Details Category        Date   Date   Treatment Clinician        



                                                 Date                 

 

       Fever  Fever  Disease Active                              [place]



                                                                  [facility]



                                   00:00:                             st



                                   00                                 







Allergies, Adverse Reactions, Alerts







       Allergy Allergy Status Severity Reaction(s) Onset  Inactive Treating Comm

ents 

Source



       Name   Type                        Date   Date   Clinician        

 

       Denilson Lynn Active        Other (See                Was told 

[place]



       ins    ty to                Comments)                  she was [facility]

i



              adverse                      00:00:               allergic st



              reaction                      00                   to PCN, 



              s to                                             so has 



              drug                                             never  



                                                               taken it 

 

       silk   DA     Active [place]            -                      [facility]



       tape                               [facility]20                        [facility]



                                          00                          l of



                                                                      [place]

 

       [place] DA     Active U                                   [facility]



       ins                                1-16                        [facility]



                                          [facility]                      [facility]



                                          00                          l of



                                                                      [place]







Social History







           Social Habit Start Date Stop Date  Quantity   Comments   Source

 

           Tobacco use and 2020 Never used            [facility]

[facility]ist



           exposure   00:00:00   00:00:00                         

 

           Alcohol intake 2020 Current drinker            [place]

on [employer]



                      00:00:00   00:00:00   of alcohol            



                                            (finding)             

 

           Alcohol Comment 2020 "occasionally"            [place]

on [employer]



                      00:00:00   00:00:00                         

 

           Sex Assigned At 1955                       [facility]

[facility]ist



           Birth      00:00:00   00:00:00                         









                Smoking Status  Start Date      Stop Date       Source

 

                Never smoker                                    [place] [facility]

t







Medications







       Ordered Filled Start  Stop   Current Ordering Indication Dosage Frequency

 Signature

                    Comments            Components          Source



     Medication Medication Date Date Medication? Clinician                (SIG) 

          



     Name Name                                                   

 

     levothyroxi            Yes            75ug QD   Take 75           To

[place]



     ne        2-02                               mcg by           [facility]



     (SYNTHROID)      14:40:                               mouth           st



     75 mcg      24                                 every           



     tablet                                         morning.           

 

     simvastatin      2020-0      Yes            20mg QD   Take 20 mg           

[place]



     (ZOCOR) 20      2-02                               by mouth           [facility]

di



     MG tablet      14:40:                               nightly.           st



               24                                                

 

     traMADol      2020-0      Yes       acute pain 50mg Q6H  Take 50 mg        

   [place]



     (ULTRAM) 50      2-02                               by mouth           Meth

prashant



     mg tablet      14:40:                               every 6           st



               24                                 (six)           



                                                  hours as           



                                                  needed for           



                                                  moderate           



                                                  pain           



                                                  .acute           



                                                  pain.           

 

     acetaminoph      2020-0      Yes            650mg Q6H  Take 650           [place]

[place]



     en        2-02                               mg by           [facility]



     (TYLENOL)      14:40:                               mouth           st



     325 MG      24                                 every 6           



     tablet                                         (six)           



                                                  hours as           



                                                  needed for           



                                                  mild pain           



                                                  or fever.           

 

     ibuprofen      2020-0      Yes            400mg Q6H  Take 400           To

ston



     (ADVIL) 200      2-02                               mg by           [facility]



     MG tablet      14:40:                               mouth           st



               24                                 every 6           



                                                  (six)           



                                                  hours as           



                                                  needed for           



                                                  mild pain.           







Immunizations







           Ordered Immunization Filled Immunization Date       Status     Commen

ts   Source



           Name       Name                                        

 

           PFIZER COVID-19 MRNA            2021 Completed             [place]

[place]



           VACCINATION            00:00:00                         [employer]

 

           PFIZER COVID-19 MRNA            2021 Completed             [place]

[place]



           VACCINATION            00:00:00                         [employer]







Procedures

This patient has no known procedures.



Plan of Care







             Planned Activity Planned Date Details      Comments     Source

 

             Future Scheduled 2021   INFLUENZA VACCINE              [place]

n [employer]



             Test         00:00:00     [code = INFLUENZA              



                                       VACCINE]                  

 

             Future Scheduled 2020   65+ PNEUMOCOCCAL              [place]

 [employer]



             Test         00:00:00     VACCINE (1 of 1 -              



                                       PPSV23) [code = 65+              



                                       PNEUMOCOCCAL VACCINE              



                                       (1 of 1 - PPSV23)]              

 

             Future Scheduled 2005   BREAST CANCER              [facility]

thodist



             Test         00:00:00     SCREENING [code =              



                                       BREAST CANCER              



                                       SCREENING]                

 

             Future Scheduled 2005   [facility]ONOSCOPY SCREENING              [facility]



             Test         00:00:00     [code = COLONOSCOPY              



                                       SCREENING]                

 

             Future Scheduled 2005   SHINGLES VACCINES (#[facility])              [facility]



             Test         00:00:00     [code = SHINGLES              



                                       VACCINES (#1)]              

 

             Future Scheduled 1976   Screening for              [facility]

thodist



             Test         00:00:00     malignant neoplasm of              



                                       cervix (procedure)              



                                       [code = 825983444]              

 

             Future Scheduled 1973   Hepatitis C screening              [facility]

[facility]



             Test         00:00:00     (procedure) [code =              



                                       360344828]                







Encounters







        Start   End     Encounter Admission Attending Care    Care    Encounter 

Source



        Date/Time Date/Time Type    Type    Clinicians Facility Department ID   

   

 

        2021 Outpatient         JACKELINE [facility]     [id number]

137 [place]



        00:00:00 00:00:00                 TAVO                 660     Me

thodi



                                                                        st

 

        2021 Outpatient                 [facility]     [id number]

114 [place]



        00:00:00 00:00:00                                         770     Method

i



                                                                        st

 

        2020 [facility]    [id number]

[id number] 



        08:00:00 23:59:00 Encounter         Esvin davila [id number]     

    



                                                [place] 4.2.7.2.686         



                                                [place]  [phone]         



                                                        806             

 

        2020 Technician         [facility]    [id number]

[id number] 



        11:56:21 12:34:48 Visit           Lab Main [place] [id number]         



                                                [place] 4.2.7.2.686         



                                                mariela [phone]         



                                                [address]                 

 

        2020 Outpatient         READER, [facility]     [id number]

004 [place]



        00:00:00 00:00:00                 [place]                 051     Method

i



                                                                        st

 

        2020 Outpatient         SUPA, [facility]     [id number]

[address] [place]



        00:00:00 00:00:00                 BONY                   807     Method

i



                                                                        st

 

        2020 Outpatient         KLEIN, FLOWER [facility]     

[id number] [place]



        00:00:00 00:00:00                                         644     Method

i



                                                                        st

 

        2020 Outpatient         KLEIN, FLOWER [facility]     

[id number] [place]



        00:00:00 00:00:00                                         481     Method

i



                                                                        st

 

        2020 Outpatient         KLEIN, FLOWER [facility]     

[id number] [place]



        00:00:00 00:00:00                                         984     Method

i



                                                                        st

 

        2020 Outpatient         KLEIN, FLOWER [facility]     

[id number] [place]



        00:00:00 00:00:00                                         725     Method

i



                                                                        st







Results







           Test Description Test Time  Test Comments Results    Result Comments 

Source









                    CHEMISTRY 7 PROFILE 2020 06:07:00 









                      Test Item  Value      Reference Range Interpretation Comme

nts









             SODIUM (test code = NA) 142 mEq/L    135-145      N            

 

             POTASSIUM (test code = K) 4.5 mEq/L    3.5-5.0      N            

 

             CHLORIDE (test code = CL) 106 mEq/L    100-115      N            

 

             CARBON DIOXIDE (test code = CO2) 31 mEq/L     22-31        N       

     

 

             ANION GAP (test code = GAP) 9.70         10-20        L            

 

             GLUCOSE (test code = GLU) 96 mg/dL            N            

 

             BLOOD UREA NITROGEN (test code = BUN) 6 mg/dL      7-18         L  

          

 

             GLOMERULAR FILTRATION RATE (test code = GFR) 72 ml/min    >60      

    N            

 

             CREATININE (test code = CREAT) 0.8 mg/dL    0.5-1.0      N         

   

 

             CALCIUM (test code = CA) 8.3 mg/dL    8.4-10.2     L            



[id number] 06:07:00





             Test Item    Value        Reference Range Interpretation Comments

 

             MAGNESIUM (test code = MAG) 1.5 mg/dL    1.8-2.4      L            



CBC W/AUTO [id number] 05:37:00





             Test Item    Value        Reference Range Interpretation Comments

 

             WHITE BLOOD CELL (test code = WBC) 9.3 K/mm3    6.6-12.1     N     

       

 

             RED BLOOD CELL (test code = RBC) 3.70 M/mm3   3.45-5.01    N       

     

 

             HEMOGLOBIN (test code = HGB) 11.2 g/dL    10.7-13.9    N           

 

 

             HEMATOCRIT (test code = HCT) 35.0 %       32.1-42.1    N           

 

 

             MEAN CELL VOLUME (test code = MCV) 95 fL        84.1-94.8    H     

       

 

             MEAN CELL HGB (test code = MCH) 30.3 pg      27-35        N        

    

 

             MEAN CELL HGB CONCETRATION (test 32.0 gm/dL   32.2-34.1    L       

     



             code = MCHC)                                        

 

             RED CELL DISTRIBUTION WIDTH (test 13.3 %       12.4-16.5    N      

      



             code = RDW)                                         

 

             PLATELET COUNT (test code = PLT) 190 K/mm3    133-385      N       

     

 

             MEAN PLATELET VOLUME (test code = 10.6 fl      9.1-12.7     N      

      



             MPV)                                                

 

             NEUTROPHIL % (test code = NT%) 74.8 %       56.5-79.4    N         

   

 

             LYMPHOCYTE % (test code = LY%) 17.3 %       14.3-34.3    N         

   

 

             MONOCYTE % (test code = MO%) 5.7 %        5.1-10.4     N           

 

 

             EOSINOPHIL % (test code = EO%) 1.4 %        0.1-3.0      N         

   

 

             BASOPHIL % (test code = BA%) 0.5 %        0.1-1.0      N           

 

 

             NEUTROPHIL # (test code = NT#) 6.9 K/mm3                           

   

 

             LYMPHOCYTE # (test code = LY#) 1.6 K/mm3                           

   

 

             MONOCYTE # (test code = MO#) 0.5 K/mm3                             

 

 

             EOSINOPHIL # (test code = EO#) 0.13 K/mm3                          

   

 

             BASOPHIL # (test code = BA#) 0.1 K/mm3                             

 

 

             RBC MORPHOLOGY REQUIRED (test code NORMAL       NORMAL             

       



             = RBCM)                                             

 

             PLATELET MORPHOLOGY REQUIRED (test NORMAL       NORMAL             

       



             code = PLTMR)                                        



SMALL INTESTINE,[id number] 19:07:00
--------------------------------------------------------------------------------

------------RUN DATE: 20                      [facility]        
              PAGE 1   RUN TIME: 1111                        Specimen Inquiry   
                RUN USER: INTERFACE                                     
----------------------------------------------------------------
----------------------------PATIENT: ABBY MCCOY                   ACCT #: 
[id number] LOC:  [facility]    U #: [id number]                                     
 AGE/SX: 64/F         ROOM: [facility]     RE20REG DR:  Flower Klein MD     
         :    55     BED:  A          DIS: 20                    
               STATUS: DIS IN       TLOC:           ----------------------------
---------------------------------------------------------------- SPEC #: 
[id number]     RECD:      STATUS:  ABDI MUSA #: 
[id number]                           MELISA: 20-    SUBM DR: Flower Klein MD  
             ENTERED:      SP TYPE: SMALLINTBX     OTHR DR:        
                      ORDERED:  LEVEL IV                         CODES: G09116 -
SMALL INTESTINE PROCEDURES: LEVEL IV (Incomplete) TISSUES:         SMALL 
INTESTINE, NOS - RECTO-SIGMOID         CLINICAL HISTORY    64 year old, 
diverticulitis  (wpd)         FINAL DIAGNOSIS    Designated "rectosigmoid", 
segmental resection:        - segment of rectosigmoid colon with diverticular 
disease associated with          peridiverticular abscesses and fibrosis        
- margins - unremarkable        - no dysplasia or neoplasia identified        - 
rectosigmoid donuts - no pathologic alterations           CPT code(s):  35452   
billy/sandrad  20 GROSS DESCRIPTION    ANATOMIC SOURCE OF TISSUE (per 
Requisition):   Rectosigmoid       The specimenis received in a formalin-filled 
container, labeled with the patient's   name and designated "rectosigmoid".  The
specimen consists of a 16 cm in length and   4.0 cm in circumference segment of 
large bowel with attached pericolonic adipose   tissue.  Additionally received 
in the same container are twounremarkable ring   shaped segments of bowel, 2.0 
and 2.5 cm.         The serosa is pink-purple and hyperemic with multiple 
adhesions.  The lumen is   strictured.  The mucosa is tan-pink, focally hemorr
hagic and edematous with multiple   diverticula, 0.3 - 1.0 cm.  The muscularis 
propria is tan, firm and thickened.    There is no perforation site.  However, 
directly adjacent to one diverticulum, there   is a 1.3 cm abscess.  
Representative sections submitted as A1 - A5, with   diverticular disease as A1 
- A4 and donuts as A5.  yassine/hamilton  20                                ** 
CONTINUED ON NEXT PAGE ** 
--------------------------------------------------------------------------------

------------RUN DATE: 20                      [facility]        
              PAGE 2   RUN TIME: 1111                            Specimen 
Inquiry                    RUN USER: INTERFACE                                  
      ------------------------------------------------------------
--------------------------------SPEC #: [id number]    PATIENT: ABBY MCCOY  
                 #[id number]  
(Continued)---------------------------------------------------------------------

-----------------------      MICROSCOPIC DESCRIPTION    The specimen consists of
a segment of rectosigmoid colon containing diverticular   disease with foci of 
peridiverticular abscesses and fibrosis.  No dysplasia or   neoplasia is 
identified.  The margins of resection and the donuts are unremarkable.    sheri gee  
20------------------------------------------------------------------------

-------------------- Signed ________________________________         
Jing Devine 20 1907       --------------
------------------------------------------------------------------------------  
                                                ** END OF REPORT **CBC W/AUTO 
[id number] 07:36:00





             Test Item    Value        Reference Range Interpretation Comments

 

             WHITE BLOOD CELL (test code = WBC) 12.2 K/mm3   6.6-12.1     H     

       

 

             RED BLOOD CELL (test code = RBC) 3.65 M/mm3   3.45-5.01    N       

     

 

             HEMOGLOBIN (test code = HGB) 11.0 g/dL    10.7-13.9    N           

 

 

             HEMATOCRIT (test code = HCT) 35.0 %       32.1-42.1    N           

 

 

             MEAN CELL VOLUME (test code = MCV) 96 fL        84.1-94.8    H     

       

 

             MEAN CELL HGB (test code = MCH) 30.1 pg      27-35        N        

    

 

             MEAN CELL HGB CONCETRATION (test 31.4 gm/dL   32.2-34.1    L       

     



             code = MCHC)                                        

 

             RED CELL DISTRIBUTION WIDTH (test 13.6 %       12.4-16.5    N      

      



             code = RDW)                                         

 

             PLATELET COUNT (test code = PLT) 187 K/mm3    133-385      N       

     

 

             MEAN PLATELET VOLUME (test code = 11.4 fl      9.1-12.7     N      

      



             MPV)                                                

 

             NEUTROPHIL % (test code = NT%) 80.2 %       56.5-79.4    H         

   

 

             LYMPHOCYTE % (test code = LY%) 14.5 %       14.3-34.3    N         

   

 

             MONOCYTE % (test code = MO%) 4.5 %        5.1-10.4     L           

 

 

             EOSINOPHIL % (test code = EO%) 0.4 %        0.1-3.0      N         

   

 

             BASOPHIL % (test code = BA%) 0.2 %        0.1-1.0      N           

 

 

             NEUTROPHIL # (test code = NT#) 9.8 K/mm3                           

   

 

             LYMPHOCYTE # (test code = LY#) 1.8 K/mm3                           

   

 

             MONOCYTE # (test code = MO#) 0.6 K/mm3                             

 

 

             EOSINOPHIL # (test code = EO#) 0.05 K/mm3                          

   

 

             BASOPHIL # (test code = BA#) 0.0 K/mm3                             

 

 

             RBC MORPHOLOGY REQUIRED (test code NORMAL       NORMAL             

       



             = RBCM)                                             

 

             PLATELET MORPHOLOGY REQUIRED (test NORMAL       NORMAL             

       



             code = PLTMR)                                        



CHEMISTRY 7 [id number] 07:31:00





             Test Item    Value        Reference Range Interpretation Comments

 

             SODIUM (test code = NA) 142 mEq/L    135-145      N            

 

             POTASSIUM (test code = K) 4.6 mEq/L    3.5-5.0      N            

 

             CHLORIDE (test code = CL) 107 mEq/L    100-115      N            

 

             CARBON DIOXIDE (test code = CO2) 31 mEq/L     22-31        N       

     

 

             ANION GAP (test code = GAP) 9.00         10-20        L            

 

             GLUCOSE (test code = GLU) 95 mg/dL            N            

 

             BLOOD UREA NITROGEN (test code = 7 mg/dL      7-18         N       

     



             BUN)                                                

 

             GLOMERULAR FILTRATION RATE (test 72 ml/min    >60          N       

     



             code = GFR)                                         

 

             CREATININE (test code = CREAT) 0.8 mg/dL    0.5-1.0      N         

   

 

             CALCIUM (test code = CA) 8.2 mg/dL    8.4-10.2     L            



[id number] 07:31:00





             Test Item    Value        Reference Range Interpretation Comments

 

             MAGNESIUM (test code = MAG) 1.7 mg/dL    1.8-2.4      L            



CBC W/AUTO [id number] 07:33:00





             Test Item    Value        Reference Range Interpretation Comments

 

             WHITE BLOOD CELL (test code = WBC) 17.9 K/mm3   6.6-12.1     H     

       

 

             RED BLOOD CELL (test code = RBC) 3.94 M/mm3   3.45-5.01    N       

     

 

             HEMOGLOBIN (test code = HGB) 12.1 g/dL    10.7-13.9    N           

 

 

             HEMATOCRIT (test code = HCT) 36.7 %       32.1-42.1    N           

 

 

             MEAN CELL VOLUME (test code = MCV) 93 fL        84.1-94.8    N     

       

 

             MEAN CELL HGB (test code = MCH) 30.7 pg      27-35        N        

    

 

             MEAN CELL HGB CONCETRATION (test 33.0 gm/dL   32.2-34.1    N       

     



             code = MCHC)                                        

 

             RED CELL DISTRIBUTION WIDTH (test 13.2 %       12.4-16.5    N      

      



             code = RDW)                                         

 

             PLATELET COUNT (test code = PLT) 214 K/mm3    133-385      N       

     

 

             MEAN PLATELET VOLUME (test code = 11.2 fl      9.1-12.7     N      

      



             MPV)                                                

 

             MANUAL DIFF REQUIRED (test code = YES                              

      



             MDIFF)                                              

 

             RBC MORPHOLOGY REQUIRED (test code NORMAL       NORMAL             

       



             = RBCM)                                             

 

             PLATELET MORPHOLOGY REQUIRED (test NORMAL       NORMAL             

       



             code = PLTMR)                                        



WBC [id number] 07:33:00





             Test Item    Value        Reference Range Interpretation Comments

 

             TOTAL CELLS COUNTED (test code = 100 #CELLS                        

     



             TCC)                                                

 

             SEGMENTED NEUTROPHILS (test code = 88 %         56.5-79.4    H     

       



             SEG)                                                

 

             LYMPHOCYTE (test code = LYMPH) 6 %          20-40        L         

   

 

             MONOCYTE (test code = MON) 4 %          0-8          N            

 

             EOSINOPHIL (test code = EOS) 2 %          0-4          N           

 

 

             PLATELET ESTIMATE (test code = ADEQUATE     ADEQ                   

   



             PLTEST)                                             

 

             PLATELET MORPHOLOGY (test code = NORMAL       NORMAL               

     



             PLTMORPH)                                           



CHEMISTRY 7 [id number] 05:51:00





             Test Item    Value        Reference Range Interpretation Comments

 

             SODIUM (test code = NA) 140 mEq/L    135-145      N            

 

             POTASSIUM (test code = K) 4.5 mEq/L    3.5-5.0      N            

 

             CHLORIDE (test code = CL) 104 mEq/L    100-115      N            

 

             CARBON DIOXIDE (test code = CO2) 27 mEq/L     22-31        N       

     

 

             ANION GAP (test code = GAP) 13.70        10-20        N            

 

             GLUCOSE (test code = GLU) 161 mg/dL           H            

 

             BLOOD UREA NITROGEN (test code = 11 mg/dL     7-18         N       

     



             BUN)                                                

 

             GLOMERULAR FILTRATION RATE (test 72 ml/min    >60          N       

     



             code = GFR)                                         

 

             CREATININE (test code = CREAT) 0.8 mg/dL    0.5-1.0      N         

   

 

             CALCIUM (test code = CA) 8.7 mg/dL    8.4-10.2     N            



[id number] 05:51:00





             Test Item    Value        Reference Range Interpretation Comments

 

             MAGNESIUM (test code = MAG) 1.7 mg/dL    1.8-2.4      L            



CBC W/AUTO [id number] 05:33:00





             Test Item    Value        Reference Range Interpretation Comments

 

             WHITE BLOOD CELL (test code = WBC) 17.9 K/mm3   6.6-12.1     H     

       

 

             RED BLOOD CELL (test code = RBC) 3.94 M/mm3   3.45-5.01    N       

     

 

             HEMOGLOBIN (test code = HGB) 12.1 g/dL    10.7-13.9    N           

 

 

             HEMATOCRIT (test code = HCT) 36.7 %       32.1-42.1    N           

 

 

             MEAN CELL VOLUME (test code = MCV) 93 fL        84.1-94.8    N     

       

 

             MEAN CELL HGB (test code = MCH) 30.7 pg      27-35        N        

    

 

             MEAN CELL HGB CONCETRATION (test 33.0 gm/dL   32.2-34.1    N       

     



             code = MCHC)                                        

 

             RED CELL DISTRIBUTION WIDTH (test 13.2 %       12.4-16.5    N      

      



             code = RDW)                                         

 

             PLATELET COUNT (test code = PLT) 214 K/mm3    133-385      N       

     

 

             MEAN PLATELET VOLUME (test code = 11.2 fl      9.1-12.7     N      

      



             MPV)                                                

 

             MANUAL DIFF REQUIRED (test code = YES                              

      



             MDIFF)                                              

 

             RBC MORPHOLOGY REQUIRED (test code              NORMAL             

       



             = RBCM)                                             

 

             PLATELET MORPHOLOGY REQUIRED (test              NORMAL             

       



             code = PLTMR)                                        



WBC [id number] 05:33:00





             Test Item    Value        Reference Range Interpretation Comments

 

             SEGMENTED NEUTROPHILS (test code = SEG)  %           56.5-79.4     

            

 

             LYMPHOCYTE (test code = LYMPH)  %           20-40                  

   



CBC W/AUTO [id number] 05:32:00





             Test Item    Value        Reference Range Interpretation Comments

 

             WHITE BLOOD CELL (test code = WBC) 17.9 K/mm3   6.6-12.1     H     

       

 

             RED BLOOD CELL (test code = RBC) 3.94 M/mm3   3.45-5.01    N       

     

 

             HEMOGLOBIN (test code = HGB) 12.1 g/dL    10.7-13.9    N           

 

 

             HEMATOCRIT (test code = HCT) 36.7 %       32.1-42.1    N           

 

 

             MEAN CELL VOLUME (test code = MCV) 93 fL        84.1-94.8    N     

       

 

             MEAN CELL HGB (test code = MCH) 30.7 pg      27-35        N        

    

 

             MEAN CELL HGB CONCETRATION (test 33.0 gm/dL   32.2-34.1    N       

     



             code = MCHC)                                        

 

             RED CELL DISTRIBUTION WIDTH (test 13.2 %       12.4-16.5    N      

      



             code = RDW)                                         

 

             PLATELET COUNT (test code = PLT) 214 K/mm3    133-385      N       

     

 

             MEAN PLATELET VOLUME (test code = 11.2 fl      9.1-12.7     N      

      



             MPV)                                                

 

             MANUAL DIFF REQUIRED (test code = YES                              

      



             MDIFF)                                              

 

             RBC MORPHOLOGY REQUIRED (test code              NORMAL             

       



             = RBCM)                                             

 

             PLATELET MORPHOLOGY REQUIRED (test              NORMAL             

       



             code = PLTMR)                                        



WBC [id number] 05:32:00





             Test Item    Value        Reference Range Interpretation Comments

 

             SEGMENTED NEUTROPHILS (test code = SEG)  %           56.5-79.4     

            

 

             LYMPHOCYTE (test code = LYMPH)  %           20-40                  

   



CHEMISTRY 7 [id number] 14:50:00





             Test Item    Value        Reference Range Interpretation Comments

 

             SODIUM (test code = NA) 142 mEq/L    135-145      N            

 

             POTASSIUM (test code = K) 4.6 mEq/L    3.5-5.0      N            

 

             CHLORIDE (test code = CL) 104 mEq/L    100-115      N            

 

             CARBON DIOXIDE (test code = CO2) 28 mEq/L     22-31        N       

     

 

             ANION GAP (test code = GAP) 14.50        10-20        N            

 

             GLUCOSE (test code = GLU) 81 mg/dL            N            

 

             BLOOD UREA NITROGEN (test code = 18 mg/dL     7-18         N       

     



             BUN)                                                

 

             GLOMERULAR FILTRATION RATE (test 72 ml/min    >60          N       

     



             code = GFR)                                         

 

             CREATININE (test code = CREAT) 0.8 mg/dL    0.5-1.0      N         

   

 

             CALCIUM (test code = CA) 9.1 mg/dL    8.4-10.2     N            



CBC W/AUTO [id number] 14:27:00





             Test Item    Value        Reference Range Interpretation Comments

 

             WHITE BLOOD CELL (test code = WBC) 6.4 K/mm3    6.6-12.1     L     

       

 

             RED BLOOD CELL (test code = RBC) 4.53 M/mm3   3.45-5.01    N       

     

 

             HEMOGLOBIN (test code = HGB) 13.5 g/dL    10.7-13.9    N           

 

 

             HEMATOCRIT (test code = HCT) 42.7 %       32.1-42.1    H           

 

 

             MEAN CELL VOLUME (test code = MCV) 94 fL        84.1-94.8    N     

       

 

             MEAN CELL HGB (test code = MCH) 29.8 pg      27-35        N        

    

 

             MEAN CELL HGB CONCETRATION (test 31.6 gm/dL   32.2-34.1    L       

     



             code = MCHC)                                        

 

             RED CELL DISTRIBUTION WIDTH (test 13.5 %       12.4-16.5    N      

      



             code = RDW)                                         

 

             PLATELET COUNT (test code = PLT) 249 K/mm3    133-385      N       

     

 

             IMMATURE PLATELET FRACTION (test 0.0 %        0.0-10.8     N       

     



             code = IPF)                                         

 

             MEAN PLATELET VOLUME (test code = 11.5 fl      9.1-12.7     N      

      



             MPV)                                                

 

             NEUTROPHIL % (test code = NT%) 58.1 %       56.5-79.4    N         

   

 

             LYMPHOCYTE % (test code = LY%) 30.9 %       14.3-34.3    N         

   

 

             MONOCYTE % (test code = MO%) 7.9 %        5.1-10.4     N           

 

 

             EOSINOPHIL % (test code = EO%) 1.7 %        0.1-3.0      N         

   

 

             BASOPHIL % (test code = BA%) 1.2 %        0.1-1.0      H           

 

 

             NEUTROPHIL # (test code = NT#) 3.7 K/mm3                           

   

 

             LYMPHOCYTE # (test code = LY#) 2.0 K/mm3                           

   

 

             MONOCYTE # (test code = MO#) 0.5 K/mm3                             

 

 

             EOSINOPHIL # (test code = EO#) 0.11 K/mm3                          

   

 

             BASOPHIL # (test code = BA#) 0.1 K/mm3                             

 

 

             RBC MORPHOLOGY REQUIRED (test code NORMAL       NORMAL             

       



             = RBCM)                                             

 

             PLATELET MORPHOLOGY REQUIRED (test NORMAL       NORMAL             

       



             code = PLTMR)

## 2021-06-05 NOTE — ER
Nurse's Notes                                                                                     

 [facility]                                                                 

Name: Sammi Brito                                                                                 

Age: 65 yrs                                                                                       

Sex: Female                                                                                       

: 1955                                                                                   

MRN: [id number]                                                                                   

Arrival Date: 2021                                                                          

Time: 14:05                                                                                       

Account#: [id number]                                                                            

Bed 4                                                                                             

Private MD:                                                                                       

Diagnosis: Mixed conductive and sensorineural hearing loss, unspecified-5 mm facial nerve         

  Schwannoma                                                                                      

                                                                                                  

Presentation:                                                                                     

                                                                                             

14:17 Chief complaint: Patient states: hearing loss to right ear that began 1 week ago, pt    aa5 

      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     

      feel like a whale under water". Coronavirus screen: At this time, the client does not       

      indicate any symptoms associated with coronavirus-19. Ebola Screen: Patient negative        

      for fever greater than or equal to 101.5 degrees Fahrenheit, and additional compatible      

      Ebola Virus Disease symptoms. Initial Sepsis Screen: Does the patient meet any 2            

      criteria? No. Patient's initial sepsis screen is negative. Does the patient have a          

      suspected source of infection? No. Patient's initial sepsis screen is negative. Risk        

      Assessment: Do you want to hurt yourself or someone else? Patient reports no desire to      

      harm self or others. Onset of symptoms was May 2021.                                        

14:17 Method Of Arrival: Ambulatory                                                           aa5 

14:17 Acuity: EDI 3                                                                           aa5 

                                                                                                  

Historical:                                                                                       

- Allergies:                                                                                      

14:20 PENICILLINS;                                                                            aa5 

- Home Meds:                                                                                      

14:20 levothyroxine oral [Active]; Simvastatin Oral [Active];                                 aa5 

- PMHx:                                                                                           

14:20 High Cholesterol; Thyroid problem;                                                      aa5 

- PSHx:                                                                                           

14:20 Appendectomy; Hysterectomy; Neck; Brain tumor removed;                                  aa5 

                                                                                                  

- Immunization history:: Adult Immunizations unknown.                                             

- Social history:: Smoking status: Patient denies any tobacco usage or history of.                

- Family history:: not pertinent.                                                                 

                                                                                                  

                                                                                                  

Screening:                                                                                        

15:14 Abuse screen: Denies threats or abuse. Denies injuries from another. Nutritional        kg  

      screening: No deficits noted. Tuberculosis screening: No symptoms or risk factors           

      identified. Fall Risk None identified. No fall in past 12 months (0 pts). No secondary      

      diagnosis (0 pts). IV access (20 points). Ambulatory Aid- None/Bed Rest/Nurse Assist (0     

      pts). Gait- Normal/Bed Rest/Wheelchair (0 pts) Mental Status- Oriented to own ability       

      (0 pts). Total Maurer Fall Scale indicates No Risk (0-24 pts).                               

                                                                                                  

Assessment:                                                                                       

15:08 General: Appears in no apparent distress. Behavior is cooperative, appropriate for age, kg  

      anxious, quiet. Pain: Denies pain. Neuro: No deficits noted. Level of Consciousness is      

      awake, alert, obeys commands, Oriented to person, place, time, situation, Appropriate       

      for age  are equal bilaterally Speech is normal. Cardiovascular: No deficits           

      noted. Heart tones S1 S2. Respiratory: Airway is patent Trachea midline Respiratory         

      effort is even, unlabored, relaxed, Respiratory pattern is regular, symmetrical, Breath     

      sounds are clear bilaterally. GI: Reports nausea. : No signs and/or symptoms were         

      reported regarding the genitourinary system. EENT: Reports ringing since one week Pt        

      stated, "I woke up this morning and couldn't hear. It sound like I'm at the bottom of a     

      well.". EENT: Reports Pt also reported, " I had some sharp stabbing pain in my right        

      eye earlier this week that has gone away.". Derm: No deficits noted. Musculoskeletal:       

      No deficits noted.                                                                          

                                                                                                  

Vital Signs:                                                                                      

14:17  / 78; Pulse 71; Resp 18 S; Temp 97.4(TE); Pulse Ox 98% on R/A; Weight 66.68 kg   aa5 

      (R); Height 5 ft. 3 in. (160.02 cm) (R);                                                    

15:00  / 57; Pulse 49; Resp 16; Pulse Ox 98% ;                                          kg  

15:30  / 64; Pulse 54; Resp 16; Pulse Ox 100% ;                                         kg  

16:00  / 50; Pulse 48; Resp 16; Pulse Ox 100% on R/A;                                   kg  

17:00  / 72; Pulse 79; Resp 18; Pulse Ox 100% on R/A;                                   kg  

14:17 Body Mass Index 26.04 (66.68 kg, 160.02 cm)                                             aa5 

                                                                                                  

ED Course:                                                                                        

14:05 Patient arrived in ED.                                                                  rg4 

14:17 Arm band placed on.                                                                     aa5 

14:19 Triage completed.                                                                       aa5 

14:33 Samantha Brandon, RN is Primary Nurse.                                                   hb  

15:07 Laly Ferreira is Primary Nurse.                                                       kg  

15:12 Ermias Pitts MD is Attending Physician.                                             rhett 

15:14 Patient has correct armband on for positive identification. Allergy band placed. Placed kg  

      in gown. Bed in low position. Call light in reach. Side rails up X 1.                       

16:17 Int Auditory Canals Wo Con In Process Unspecified.                                      EDMS

17:06 Dorina Johnston MD is Referral Physician.                                           [facility] 

17:40 No provider procedures requiring assistance completed. Patient did not have IV access   kg  

      during this emergency room visit.                                                           

                                                                                                  

Administered Medications:                                                                         

17:38 Drug: predniSONE 60 mg Route: PO;                                                       kg  

17:41 Follow up: Response: No adverse reaction                                                kg  

                                                                                                  

                                                                                                  

Outcome:                                                                                          

17:12 Discharge ordered by MD.                                                                rhett 

17:40 Discharged to home ambulatory.                                                          kg  

17:40 Condition: good                                                                             

17:40 Discharge instructions given to patient, Instructed on discharge instructions, follow       

      up and referral plans. Demonstrated understanding of instructions, follow-up care,          

      medications, Prescriptions given X 2.                                                       

18:20 Patient left the ED.                                                                    kg  

                                                                                                  

Signatures:                                                                                       

Dispatcher [employer]                           EDMS                                                 

Ermias Pitts MD MD cha Calderon, Audri, RN                     RN   aa5                                                  

Samantha Brandon, RN                     RN   Denae Espinoza                                 rg4                                                  

Laly Ferreira RN                     RN   kg                                                   

                                                                                                  

**************************************************************************************************

## 2021-06-05 NOTE — RAD REPORT
EXAM DESCRIPTION:  CT - Int Auditory Canals Wo Con - 6/5/2021 4:17 pm

 

CLINICAL HISTORY:  iac protocol

Right sided hearing loss, pain

 

COMPARISON:  No comparisons

 

TECHNIQUE:  The temporal bones were scanned in the direct axial plane using high resolution thin slic
es without contrast. Coronal and sagittal reformatted images were obtained and reviewed.

 

All CT scans are performed using dose optimization technique as appropriate and may include automated
 exposure control or mA/KV adjustment according to patient size.

 

FINDINGS:  Evidence of previous left-sided partial mastoidectomy is seen. Significant left-sided post
operative changes are noted.

 

The right external auditory canal appears normal. The right tympanic membrane is intact. The right-si
ded ossicles appear normal. No cholesteatoma evident. Inner ear structures on the right appear unrema
rkable. 5 x 5 mm soft tissue density lesion is seen involving the medial aspect of the right mastoid 
air cells, along the course of the facial nerve. This is suspicious for a facial nerve schwannoma.

 

IMPRESSION:  5 mm soft tissue lesion is seen along the course of the facial within the medial aspect 
of the right mastoid air cells, most suspicious for facial nerve schwannoma.

 

Extensive left-sided postoperative changes are present.

## 2021-06-05 NOTE — EDPHYS
Physician Documentation                                                                           

 [facility]                                                                 

Name: Sammi Brito                                                                                 

Age: 65 yrs                                                                                       

Sex: Female                                                                                       

: 1955                                                                                   

MRN: [id number]                                                                                   

Arrival Date: 2021                                                                          

Time: 14:05                                                                                       

Account#: [id number]                                                                            

Bed 4                                                                                             

Private MD:                                                                                       

ED Physician Ermias Pitts                                                                      

HPI:                                                                                              

                                                                                             

15:43 This 65 yrs old  Female presents to ER via Ambulatory with complaints of       rhett 

      Hearing Problem.                                                                            

15:43 The patient presents with hearing loss, complete. The complaints affect the right ear.  rhett 

      Onset: The symptoms/episode began/occurred just prior to arrival, this morning.             

      Modifying factors: The symptoms are alleviated by nothing, the symptoms are aggravated      

      by nothing. Associated signs and symptoms: The patient has no apparent associated signs     

      or symptoms. Severity of symptoms: At their worst the symptoms were mild in the             

      emergency department the symptoms are unchanged. The patient has experienced a previous     

      episode, 25 plus years ago , lost hearing left ear, acoustic neuroma.                       

                                                                                                  

Historical:                                                                                       

- Allergies:                                                                                      

14:20 PENICILLINS;                                                                            aa5 

- Home Meds:                                                                                      

14:20 levothyroxine oral [Active]; Simvastatin Oral [Active];                                 aa5 

- PMHx:                                                                                           

14:20 High Cholesterol; Thyroid problem;                                                      aa5 

- PSHx:                                                                                           

14:20 Appendectomy; Hysterectomy; Neck; Brain tumor removed;                                  aa5 

                                                                                                  

- Immunization history:: Adult Immunizations unknown.                                             

- Social history:: Smoking status: Patient denies any tobacco usage or history of.                

- Family history:: not pertinent.                                                                 

                                                                                                  

                                                                                                  

ROS:                                                                                              

15:43 Constitutional: Negative for fever, chills, and weight loss, Eyes: Negative for injury, rhett 

      pain, redness, and discharge, Neck: Negative for injury, pain, and swelling,                

      Cardiovascular: Negative for chest pain, palpitations, and edema, Respiratory: Negative     

      for shortness of breath, cough, wheezing, and pleuritic chest pain, Abdomen/GI:             

      Negative for abdominal pain, nausea, vomiting, diarrhea, and constipation, Back:            

      Negative for injury and pain, : Negative for injury, bleeding, discharge, and             

      swelling, MS/Extremity: Negative for injury and deformity, Skin: Negative for injury,       

      rash, and discoloration, Neuro: Negative for headache, weakness, numbness, tingling,        

      and seizure, Psych: Negative for depression, anxiety, suicide ideation, homicidal           

      ideation, and hallucinations, Allergy/Immunology: Negative for hives, rash, and             

      allergies, Endocrine: Negative for neck swelling, polydipsia, polyuria, polyphagia, and     

      marked weight changes, Hematologic/Lymphatic: Negative for swollen nodes, abnormal          

      bleeding, and unusual bruising.                                                             

15:43 ENT:                                                                                        

15:43 ENT: Positive for tinnitus.                                                                 

                                                                                                  

Exam:                                                                                             

15:43 Constitutional:  This is a well developed, well nourished patient who is awake, alert,  rhett 

      and in no acute distress. Head/Face:  Normocephalic, atraumatic. Eyes:  Pupils equal        

      round and reactive to light, extra-ocular motions intact.  Lids and lashes normal.          

      Conjunctiva and sclera are non-icteric and not injected.  Cornea within normal limits.      

      Periorbital areas with no swelling, redness, or edema. ENT:  Nares patent. No nasal         

      discharge, no septal abnormalities noted.  Tympanic membranes are normal and external       

      auditory canals are clear.  Oropharynx with no redness, swelling, or masses, exudates,      

      or evidence of obstruction, uvula midline.  Mucous membranes moist. Neck:  Trachea          

      midline, no thyromegaly or masses palpated, and no cervical lymphadenopathy.  Supple,       

      full range of motion without nuchal rigidity, or vertebral point tenderness.  No            

      Meningismus. Chest/axilla:  Normal chest wall appearance and motion.  Nontender with no     

      deformity.  No lesions are appreciated. Cardiovascular:  Regular rate and rhythm with a     

      normal S1 and S2.  No gallops, murmurs, or rubs.  Normal PMI, no JVD.  No pulse             

      deficits. Respiratory:  Lungs have equal breath sounds bilaterally, clear to                

      auscultation and percussion.  No rales, rhonchi or wheezes noted.  No increased work of     

      breathing, no retractions or nasal flaring. Abdomen/GI:  Soft, non-tender, with normal      

      bowel sounds.  No distension or tympany.  No guarding or rebound.  No evidence of           

      tenderness throughout. Back:  No spinal tenderness.  No costovertebral tenderness.          

      Full range of motion. Female :  Normal external genitalia. Skin:  Warm, dry with          

      normal turgor.  Normal color with no rashes, no lesions, and no evidence of cellulitis.     

      MS/ Extremity:  Pulses equal, no cyanosis.  Neurovascular intact.  Full, normal range       

      of motion. Neuro:  Awake and alert, GCS 15, oriented to person, place, time, and            

      situation.  Cranial nerves II-XII grossly intact.  Motor strength 5/5 in all                

      extremities.  Sensory grossly intact.  Cerebellar exam normal.  Normal gait. Psych:         

      Awake, alert, with orientation to person, place and time.  Behavior, mood, and affect       

      are within normal limits.                                                                   

                                                                                                  

Vital Signs:                                                                                      

14:17  / 78; Pulse 71; Resp 18 S; Temp 97.4(TE); Pulse Ox 98% on R/A; Weight 66.68 kg   aa5 

      (R); Height 5 ft. 3 in. (160.02 cm) (R);                                                    

15:00  / 57; Pulse 49; Resp 16; Pulse Ox 98% ;                                          kg  

15:30  / 64; Pulse 54; Resp 16; Pulse Ox 100% ;                                         kg  

16:00  / 50; Pulse 48; Resp 16; Pulse Ox 100% on R/A;                                   kg  

17:00  / 72; Pulse 79; Resp 18; Pulse Ox 100% on R/A;                                   kg  

14:17 Body Mass Index 26.04 (66.68 kg, 160.02 cm)                                             aa5 

                                                                                                  

MDM:                                                                                              

15:12 Patient medically screened.                                                             [facility] 

15:46 Differential diagnosis: otitis media, otitis externa, ruptured TM, acute otalgia. Data  rhett 

      reviewed: vital signs, nurses notes. Data interpreted: Cardiac monitor: rate is 71          

      beats/min, rhythm is regular, Pulse oximetry: on room air is 66 %. Counseling: I had a      

      detailed discussion with the patient and/or guardian regarding: the historical points,      

      exam findings, and any diagnostic results supporting the discharge/admit diagnosis,         

      radiology results, the need for outpatient follow up, for definitive care, an ENT           

      specialist.                                                                                 

17:13 Physician consultation: Dorina Johnston MD and will see patient in office, would like rhett 

      medications started, prednisone taper and schedule an Audiogram.                            

                                                                                                  

                                                                                             

15:42 Order name: Int Auditory Canals Wo Con; Complete Time: 16:57                            EDMS

                                                                                                  

Administered Medications:                                                                         

17:38 Drug: predniSONE 60 mg Route: PO;                                                       kg  

17:41 Follow up: Response: No adverse reaction                                                kg  

                                                                                                  

                                                                                                  

Disposition:                                                                                      

21 17:12 Discharged to Home. Impression: Mixed conductive and sensorineural hearing         

  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                                               

- Condition is Stable.                                                                            

- Discharge Instructions: Hearing Loss.                                                           

                                                                                                  

- Medication Reconciliation Form, Thank You Letter, Antibiotic Education, Prescription            

  Opioid Use form.                                                                                

- Follow up: Private Physician; When: 2 - 3 days; Reason: Recheck today's complaints,             

  Continuance of care, Re-evaluation by your physician. Follow up: Dorina Johnston MD; When: 2 - 3 days; Reason: Recheck today's complaints, Re-evaluation by your                 

  physician.                                                                                      

- Problem is new.                                                                                 

- Symptoms have improved.                                                                         

                                                                                                  

                                                                                                  

                                                                                                  

Signatures:                                                                                       

Dispatcher [employer]                           EDMS                                                 

Ermias Pitts MD MD cha Calderon, Audri, RN                     RN   aa5                                                  

Laly Ferreira RN                     RN   kg                                                   

                                                                                                  

Corrections: (The following items were deleted from the chart)                                    

15:42 15:34 Head Brain Wo Cont+CT.RAD.BRZ ordered. EDMS                                       EDMS

18:20 17:12 2021 17:12 Discharged to Home. Impression: Mixed conductive and             kg  

      sensorineural hearing loss, unspecified - 5 mm facial nerve Schwannoma. Condition is        

      Stable. Forms are Medication Reconciliation Form, Thank You Letter, Antibiotic              

      Education, Prescription Opioid Use. Follow up: Private Physician; When: 2 - 3 days;         

      Reason: Recheck today's complaints, Continuance of care, Re-evaluation by your              

      physician. Follow up: Dorina Johnston; When: 2 - 3 days; Reason: Recheck today's          

      complaints, Re-evaluation by your physician. Problem is new. Symptoms have improved. rhett    

                                                                                                  

**************************************************************************************************

## 2021-06-11 NOTE — P.CNS
Date of Consult: 06/05/21


I was contaact via telephone by the ER staff for recommendation on patient 

management.  Dr Pitts reported that the patient had a remote history of left 

vestibular schwannoma with surgery many years ago resulting in profound left 

deafness and came to the ER with sudden decrease in right hearing and was very 

concerned.  The right ear exam was normal per his report without sign of 

infection or middle ear fluid.  A CT of the temporal bone without contrast had 

been performed and showed left surgical changes.  The right mastoid and middle 

ear were clear from fluid/opacification, the right bony IAC appeared normal and 

there was concern for a right facial nerve lesion.


Based on the presumptive diagnosis of right sudden idiopathic SNHL with pre-

existing left profound deafness, I recommended a prednisone taper of 60mg 

decreasing by 10mg every 3 days, an urgent audiogram on Monday with my office 

and follow-up medical appointment with me, my NP Kimberly Bain or with Dr Stephanie Meehan. 


The ER agreed with the plan and did not request that I evaluate the patient in 

person at this time.

## 2023-01-23 ENCOUNTER — HOSPITAL ENCOUNTER (EMERGENCY)
Dept: HOSPITAL 97 - ER | Age: 68
Discharge: HOME | End: 2023-01-23
Payer: COMMERCIAL

## 2023-01-23 VITALS — DIASTOLIC BLOOD PRESSURE: 78 MMHG | SYSTOLIC BLOOD PRESSURE: 136 MMHG | OXYGEN SATURATION: 99 %

## 2023-01-23 VITALS — TEMPERATURE: 98.2 F

## 2023-01-23 DIAGNOSIS — Z88.0: ICD-10-CM

## 2023-01-23 DIAGNOSIS — R03.0: ICD-10-CM

## 2023-01-23 DIAGNOSIS — R10.32: Primary | ICD-10-CM

## 2023-01-23 LAB
ALBUMIN SERPL BCP-MCNC: 3.4 G/DL (ref 3.4–5)
ALP SERPL-CCNC: 74 U/L (ref 45–117)
ALT SERPL W P-5'-P-CCNC: 19 U/L (ref 13–56)
AST SERPL W P-5'-P-CCNC: 18 U/L (ref 15–37)
BUN BLD-MCNC: 17 MG/DL (ref 7–18)
GLUCOSE SERPLBLD-MCNC: 101 MG/DL (ref 74–106)
HCT VFR BLD CALC: 39.4 % (ref 36–45)
LIPASE SERPL-CCNC: 142 U/L (ref 73–393)
LYMPHOCYTES # SPEC AUTO: 1.5 K/UL (ref 0.7–4.9)
MCV RBC: 90.7 FL (ref 80–100)
PMV BLD: 9.1 FL (ref 7.6–11.3)
POTASSIUM SERPL-SCNC: 3.7 MMOL/L (ref 3.5–5.1)
RBC # BLD: 4.34 M/UL (ref 3.86–4.86)

## 2023-01-23 PROCEDURE — 74177 CT ABD & PELVIS W/CONTRAST: CPT

## 2023-01-23 PROCEDURE — 99284 EMERGENCY DEPT VISIT MOD MDM: CPT

## 2023-01-23 PROCEDURE — 85025 COMPLETE CBC W/AUTO DIFF WBC: CPT

## 2023-01-23 PROCEDURE — 36415 COLL VENOUS BLD VENIPUNCTURE: CPT

## 2023-01-23 PROCEDURE — 81003 URINALYSIS AUTO W/O SCOPE: CPT

## 2023-01-23 PROCEDURE — 83690 ASSAY OF LIPASE: CPT

## 2023-01-23 PROCEDURE — 80053 COMPREHEN METABOLIC PANEL: CPT

## 2023-01-23 NOTE — ER
Nurse's Notes                                                                                     

 [facility]                                                                 

Name: Sammi Brito                                                                                 

Age: 67 yrs                                                                                       

Sex: Female                                                                                       

: 1955                                                                                   

MRN: [id number]                                                                                   

Arrival Date: 2023                                                                          

Time: 09:29                                                                                       

Account#: [id number]                                                                            

Bed 5                                                                                             

Private MD: Anju Bonilla                                                                    

Diagnosis: Lower abdominal pain, unspecified;Elevated blood-pressure reading, without diagnosis of

  hypertension                                                                                    

                                                                                                  

Presentation:                                                                                     

                                                                                             

09:38 Chief complaint: Patient states: LLQ pain that has been episodic x 3 weeks. Denies      ss  

      N/V/D. HX of bowel resection. Coronavirus screen: Client denies travel out of the [place]      

      in the last 14 days. Ebola Screen: Patient denies exposure to infectious person.            

      Patient denies travel to an Ebola-affected area in the 21 days before illness onset.        

      Initial Sepsis Screen: Does the patient meet any 2 criteria? No. Patient's initial          

      sepsis screen is negative. Does the patient have a suspected source of infection? No.       

      Patient's initial sepsis screen is negative. Risk Assessment: Do you want to hurt           

      yourself or someone else? Patient reports no desire to harm self or others. Onset of        

      symptoms was 2023.                                                               

09:38 Method Of Arrival: Ambulatory                                                           ss  

09:38 Acuity: EDI 3                                                                           ss  

                                                                                                  

Historical:                                                                                       

- Allergies:                                                                                      

09:40 PENICILLINS;                                                                            ss  

- PMHx:                                                                                           

09:40 High Cholesterol; Hypothyroidism;                                                       ss  

09:44 brain tumor;                                                                            ss  

- PSHx:                                                                                           

09:40 Unsuccessful brain surgery 2022; bowel resection; Total hysterectomy;              ss  

                                                                                                  

- Immunization history:: Client reports receiving the 2nd dose of the Covid vaccine.              

- Social history:: Smoking status: Patient denies any tobacco usage or history of.                

                                                                                                  

                                                                                                  

Screenin:45 [facility] ED Fall Risk Assessment (Adult) History of falling in the last 3 months,       ko1 

      including since admission No falls in past 3 months (0 pts) Confusion or Disorientation     

      No (0 pts) Intoxicated or Sedated No (0 pts) Impaired Gait No (0 pts) Mobility Assist       

      Device Used No (0 pt) Altered Elimination No (0 pt) Score/Fall Risk Level 0 - 2 = Low       

      Risk Oriented to surroundings, Maintained a safe environment, Educated pt \T\ family on     

      fall prevention, incl call for assistance when getting out of bed, Assessed \T\             

      reinforced patient's understanding of fall precautions, Provided non-skid footwear,         

      Hourly rounding (assess needs \T\ fall precautionary measures) done, Used ambulatory aids   

      as needed (educated on \T\ assisted with), Used gait belt as appropriate. Abuse screen:     

      Denies threats or abuse. Denies injuries from another. Nutritional screening: No            

      deficits noted. Tuberculosis screening: No symptoms or risk factors identified.             

                                                                                                  

Assessment:                                                                                       

09:45 General: Appears in no apparent distress. comfortable, Behavior is calm, cooperative,   ko1 

      appropriate for age. Pain: Complains of pain in left lower quadrant. Neuro: No deficits     

      noted. Cardiovascular: No deficits noted. Respiratory: No deficits noted. GI: Bowel         

      sounds present X 4 quads. Abd is soft X 4 quads Reports lower abdominal pain, diarrhea.     

      : No deficits noted. EENT: No deficits noted. Derm: No deficits noted.                    

      Musculoskeletal: No deficits noted.                                                         

                                                                                                  

Vital Signs:                                                                                      

09:38  / 79; Pulse 74; Resp 16; Temp 98.2(TE); Pulse Ox 100% on R/A; Weight 67.13 kg;   ss  

      Height 5 ft. 3 in. (160.02 cm); Pain 4/10;                                                  

09:45  / 78; Pulse 80; Pulse Ox 100% ;                                                  ko1 

10:00  / 79; Pulse 72; Resp 16; Pulse Ox 100% ;                                         ko1 

10:15  / 75; Pulse 86; Resp 16; Pulse Ox 100% ;                                         ko1 

12:12  / 78; Pulse 82; Pulse Ox 99% ;                                                   ko1 

09:38 Body Mass Index 26.22 (67.13 kg, 160.02 cm)                                               

                                                                                                  

ED Course:                                                                                        

09:29 Patient arrived in ED.                                                                  mr  

09:30 Anju Bonilla is Private Physician.                                                mr  

09:35 Josr Akbar DO is Attending Physician.                                                ms3 

09:40 Triage completed.                                                                       ss  

09:40 Arm band placed on right wrist.                                                         ss  

09:43 Samra Gutierrez, RN is Primary Nurse.                                                     ko1 

09:45 Patient has correct armband on for positive identification. Placed in gown. Bed in low  ko1 

      position. Call light in reach. Side rails up X 1. Cardiac monitor on. Pulse ox on. Warm     

      blanket given.                                                                              

10:08 Initial lab(s) drawn, by me, sent to lab. Inserted saline lock: 20 gauge in right       em1 

      antecubital area, using aseptic technique. Blood collected.                                 

10:57 CT Abd/Pelvis - IV Contrast Only In Process Unspecified.                                EDMS

12:42 Anju Bonilla is Referral Physician.                                               ms3 

12:42 Markos Jauregui MD is Referral Physician.                                              ms3 

12:49 No provider procedures requiring assistance completed. IV discontinued, intact,         ap3 

      bleeding controlled, No redness/swelling at site. Pressure dressing applied.                

                                                                                                  

Administered Medications:                                                                         

No medications were administered                                                                  

                                                                                                  

                                                                                                  

Medication:                                                                                       

12:50 VIS not applicable for this client.                                                     ap3 

                                                                                                  

Outcome:                                                                                          

12:43 Discharge ordered by MD.                                                                ms3 

12:49 Discharged to home ambulatory.                                                          ap3 

12:49 Condition: good                                                                             

12:49 Discharge instructions given to patient, Instructed on discharge instructions, follow       

      up and referral plans. Demonstrated understanding of instructions, follow-up care.          

12:50 Patient left the ED.                                                                    ap3 

                                                                                                  

Signatures:                                                                                       

Dispatcher [employer]                           EDMS                                                 

Debora Arauz, Omar                               em1                                                  

Gris Pickard RN RN   ss                                                   

Keeley Salvador RN RN   ap3                                                  

Josr Akbar DO DO   ms3                                                  

Samra Gutierrez, MAUDE                       RN   ko1                                                  

                                                                                                  

Corrections: (The following items were deleted from the chart)                                    

09:41 09:40 PMHx: Thyroid problem; ss                                                         ss  

                                                                                                  

**************************************************************************************************

## 2023-01-23 NOTE — EDPHYS
Physician Documentation                                                                           

 [facility]                                                                 

Name: Sammi Brito                                                                                 

Age: 67 yrs                                                                                       

Sex: Female                                                                                       

: 1955                                                                                   

MRN: [id number]                                                                                   

Arrival Date: 2023                                                                          

Time: 09:29                                                                                       

Account#: [id number]                                                                            

Bed 5                                                                                             

Private MD: Anju Bonilla                                                                    

ED Physician Josr Akbar                                                                         

HPI:                                                                                              

                                                                                             

12:44 This 67 yrs old Female presents to ER via Ambulatory with complaints of Abdominal Pain. ms3 

12:44 67-year-old female with past medical history of hyperlipidemia, hypothyroidism,         ms3 

      schwannomas presents for left lower quadrant abdominal pain that is been going on for 3     

      weeks. Patient states pain is been intermittent. Patient rates her pain a 4-5 over 10.      

      Patient states she does have a history of bowel resection secondary to diverticulitis.      

      Patient denies nausea, vomiting, fevers, chills, diarrhea..                                 

                                                                                                  

Historical:                                                                                       

- Allergies:                                                                                      

09:40 PENICILLINS;                                                                            ss  

- PMHx:                                                                                           

09:40 High Cholesterol; Hypothyroidism;                                                       ss  

09:44 brain tumor;                                                                            ss  

- PSHx:                                                                                           

09:40 Unsuccessful brain surgery 2022; bowel resection; Total hysterectomy;              ss  

                                                                                                  

- Immunization history:: Client reports receiving the 2nd dose of the Covid vaccine.              

- Social history:: Smoking status: Patient denies any tobacco usage or history of.                

                                                                                                  

                                                                                                  

ROS:                                                                                              

12:44 Constitutional: Negative for fever, and chills. Neck: Negative for injury, pain, and    ms3 

      swelling, Cardiovascular: Negative for chest pain, and palpitations. Respiratory:           

      Negative for shortness of breath, cough, wheezing, and pleuritic chest pain.                

12:44 MS/Extremity: Negative for injury and deformity, Skin: Negative for injury, rash, and       

      discoloration.                                                                              

12:44 Abdomen/GI: Positive for abdominal pain, Negative for nausea, vomiting, and diarrhea.       

12:44 All other systems are negative.                                                             

                                                                                                  

Exam:                                                                                             

12:44 Constitutional:  This is a well developed, well nourished patient who is awake, alert,  ms3 

      and in no acute distress. Head/Face:  Normocephalic, atraumatic. Neck:  Trachea             

      midline, no cervical lymphadenopathy.  Supple, full range of motion without nuchal          

      rigidity, or vertebral point tenderness.  No Meningismus. Chest/axilla:  Normal chest       

      wall appearance and motion.  Nontender with no deformity.   Cardiovascular:  Regular        

      rate and rhythm with a normal S1 and S2.  No gallops, murmurs, or rubs.  Normal PMI, no     

      JVD.  No pulse deficits. Respiratory:  Lungs have equal breath sounds bilaterally,          

      clear to auscultation and percussion.  No rales, rhonchi or wheezes noted.  No              

      increased work of breathing, no retractions or nasal flaring. Abdomen/GI:  Soft,            

      non-tender, with normal bowel sounds.  No distension or tympany.  No guarding or            

      rebound.  No evidence of tenderness throughout. Skin:  Warm, dry with normal turgor.        

      Normal color with no rashes, no lesions, and no evidence of cellulitis. MS/ Extremity:      

      Pulses equal, no cyanosis.  Neurovascular intact.  Full, normal range of motion.            

                                                                                                  

Vital Signs:                                                                                      

09:38  / 79; Pulse 74; Resp 16; Temp 98.2(TE); Pulse Ox 100% on R/A; Weight 67.13 kg;   ss  

      Height 5 ft. 3 in. (160.02 cm); Pain 4/10;                                                  

09:45  / 78; Pulse 80; Pulse Ox 100% ;                                                  ko1 

10:00  / 79; Pulse 72; Resp 16; Pulse Ox 100% ;                                         ko1 

10:15  / 75; Pulse 86; Resp 16; Pulse Ox 100% ;                                         ko1 

12:12  / 78; Pulse 82; Pulse Ox 99% ;                                                   ko1 

09:38 Body Mass Index 26.22 (67.13 kg, 160.02 cm)                                             ss  

                                                                                                  

MDM:                                                                                              

09:48 Patient medically screened.                                                             ms3 

12:44 Differential diagnosis: bowel obstruction, diverticulitis, non-specific abd pain. Data  ms3 

      reviewed: vital signs, nurses notes, lab test result(s), radiologic studies, and as a       

      result, I will discharge patient. Consideration of Admission/Observation Escalation of      

      care including admission/observation considered. No emergent medical condition              

      necessitating admission found at this time. Care significantly affected by the              

      following chronic conditions: Hyperlipidemia.                                               

12:44 Counseling: I had a detailed discussion with the patient and/or guardian regarding: the ms3 

      historical points, exam findings, and any diagnostic results supporting the                 

      discharge/admit diagnosis, lab results, radiology results, the need for outpatient          

      follow up. Special discussion: I discussed with the patient/guardian in detail that at      

      this point there is no indication for admission to the hospital. It is understood,          

      however, that if the symptoms persist or worsen the patient needs to return immediately     

      for re-evaluation. ED course: Discussed labs and CT scans with patient. Patient to          

      follow-up with Dr. Jauregui in 2 to 3 days. Patient understands agrees with plan. All         

      questions were answered. Return precautions discussed include worsening symptoms, or        

      any other concerns..                                                                        

                                                                                                  

                                                                                             

09:55 Order name: CBC with Diff; Complete Time: 10:38                                         ms3 

                                                                                             

09:55 Order name: CMP; Complete Time: 10:38                                                   ms3 

                                                                                             

09:55 Order name: Lipase; Complete Time: 10:38                                                ms3 

                                                                                             

09:55 Order name: CT Abd/Pelvis - IV Contrast Only; Complete Time: 12:36                      ms3 

                                                                                             

09:55 Order name: IV Saline Lock; Complete Time: 10:08                                        ms3 

                                                                                             

10:00 Order name: Urine Dipstick-Ancillary; Complete Time: 10:38                              EDMS

                                                                                             

09:55 Order name: Labs collected and sent; Complete Time: 10:08                               ms3 

                                                                                                  

Administered Medications:                                                                         

No medications were administered                                                                  

                                                                                                  

                                                                                                  

Disposition Summary:                                                                              

23 12:43                                                                                    

Discharge Ordered                                                                                 

      Location: Home                                                                          ms3 

      Condition: Stable                                                                       ms3 

      Diagnosis                                                                                   

        - Lower abdominal pain, unspecified                                                   ms3 

        - Elevated blood-pressure reading, without diagnosis of hypertension                  ms3 

      Followup:                                                                               ms3 

        - With: Anju Bonilla                                                                 

        - When: 2 - 3 days                                                                         

        - Reason: Recheck today's complaints                                                       

      Followup:                                                                               ms3 

        - With: Markos Jauregui MD                                                                

        - When: 2 - 3 days                                                                         

        - Reason: Recheck today's complaints                                                       

      Discharge Instructions:                                                                     

        - Discharge Summary Sheet                                                             ms3 

        - Abdominal Pain, Adult                                                               ms3 

        - Diverticulosis                                                                      ms3 

      Forms:                                                                                      

        - Medication Reconciliation Form                                                      ms3 

        - Thank You Letter                                                                    ms3 

        - Antibiotic Education                                                                ms3 

        - Prescription Opioid Use                                                             ms3 

Signatures:                                                                                       

Dispatcher [employer]                           Gris Mathews RN                      RN   Josr Rueda DO                        DO   ms3                                                  

                                                                                                  

Corrections: (The following items were deleted from the chart)                                    

09:41 09:40 PMHx: Thyroid problem; ss                                                         ss  

                                                                                                  

**************************************************************************************************

## 2023-01-23 NOTE — RAD REPORT
EXAM DESCRIPTION:  CTAbdomen   Pelvis W Contrast - 1/23/2023 10:55 am

 

CLINICAL HISTORY:  Abdominal pain.

LLQ abdominal pain

 

COMPARISON:  Abdomen   Pelvis W Contrast dated 12/23/2019; Abdomen   Pelvis W Contrast dated 11/16/20
19; CT ABD PELVIS W CONTRAST dated 5/21/2015

 

TECHNIQUE:  Biphasic CT imaging of the abdomen and pelvis was performed with 100 ml non-ionic IV cont
rast.

 

All CT scans are performed using dose optimization technique as appropriate and may include automated
 exposure control or mA/KV adjustment according to patient size.

 

FINDINGS:  The lung bases are clear.

 

The liver, spleen, pancreas, adrenal glands and kidneys are within normal limits.

 

No bowel obstruction, free air, free fluid or abscess. Moderate stool is present throughout the colon
 with diverticulosis coli noted. Postop changes involve the sigmoid colon. The appendix is normal.  N
o evidence of significant lymphadenopathy.

 

Mild lumbar degenerative changes.

 

IMPRESSION:  Moderate constipation is present with diverticulosis coli. No diverticulitis findings ev
ident.

## 2023-01-23 NOTE — XMS REPORT
Continuity of Care Document

                           Created on:2023



Patient:ABBY BRITO

Sex:Female

:1955

External Reference #:[id number]





Demographics







                          Address                   [address]



                                                    [place] [address]

 

                          Home Phone                [phone]

 

                          Work Phone                [phone]

 

                          Mobile Phone              [phone]

 

                          Email Address             [email or web address]

 

                          Preferred Language        English

 

                          Marital Status            Unknown

 

                          Jain Affiliation     Unknown

 

                          Race                      Unknown

 

                          Additional Race(s)        White



                                                    Unavailable

 

                          Ethnic Group              Unknown









Author







                          Organization              [facility]

t

 

                          Address                   [address]



                                                    [place] [address]

 

                          Phone                     [phone]









Support







                Name            Relationship    Address         Phone

 

                Chirag Brito    Spouse          [address]      [phone]



                                                [place] [address] 

 

                CHIRAG BRITO    Unavailable     [address]      [phone]



                                                [place] [address] 









Care Team Providers







                    Name                Role                Phone

 

                    GEOVANNA ROBLERO    Primary Care Physician Unavailable

 

                    Sandi Mckenzie MD   Attending Clinician [phone]

 

                    Derrek Merchant MD Attending Clinician [phone]

 

                    Dolores Alvarez   Attending Clinician [phone]

 

                    Brenda Dow MD Attending Clinician [phone]

[phone]

 

                    Keeley Gallegos Attending Clinician [phone]

 

                    Bailey Churchill MA Attending Clinician Unavailable

 

                    KARLA RUVALCABA Attending Clinician Unavailable

 

                    KARLA RUVALCABA Attending Clinician Unavailable

 

                    Doctor Unassigned, No Name Attending Clinician Unavailable

 

                    TAVO VIVEROS Attending Clinician Unavailable

 

                    Jeanne Justin MD Attending Clinician [phone]

 

                    JEANNE JUSTIN Attending Clinician Unavailable

 

                    [facility]   Attending Clinician Unavailable

 

                    WASHINGTON WRIGHT     Attending Clinician Unavailable

 

                    BONY COWART      Attending Clinician Unavailable

 

                    Flower Klein        Attending Clinician Unavailable

 

                    FLOWER KLEIN          Attending Clinician Unavailable

 

                    DERREK MERCHANT     Admitting Clinician Unavailable

 

                    BONY COWART      Admitting Clinician Unavailable









Payers







           Payer Name Policy Type Policy Number Effective Date Expiration Date S

[facility]            [id number]  2021            



           [facility]                       00:00:00              



           PPO                                                    

 

           [employer]             [id number]            



                                            00:00:00              







Problems







       Condition Condition Condition Status Onset  Resolution Last   Treating Co

mments 

Source



       Name   Details Category        Date   Date   Treatment Clinician        



                                                 Date                 

 

       Hemifacial Hemifacial Disease Active                              M

ethodi



       spasm of spasm of                                              st



       right side right side               00:00:                             Ho

spita



       of face of face               00                                 l

 

       Fever  Fever  Disease Active                              [facility]



                                                                  st



                                   00:00:                             Hospita



                                   00                                 l

 

       No known No known Disease                                           Unive

rs



       active active                                                  ity of



       problems problems                                                  [place]



                                                                      [facility]



                                                                      [place]







Allergies, Adverse Reactions, Alerts







       Allergy Allergy Status Severity Reaction(s) Onset  Inactive Treating Comm

ents 

Source



       Name   Type                        Date   Date   Clinician        

 

       Penicill Propensi Active        Other (See                Was told 

[facility]



       ins    ty to                Comments)                  she was st



              adverse                      00:00:               allergic Hospita



              reaction                      00                   to PCN, l



              s to                                             so has 



              drug                                             never  



                                                               taken it 

 

       silk   DA     Active MO     RASH,                        [facility]



       tape                        REDNESS -20                        [facility]



                                          00[facility]:                      [facility]



                                          00                          l of



                                                                      [place]

 

       Penicill DA     Active U                                   [facility]



       ins                                -16                        [facility]



                                          00:00:                      [facility]



                                          00                          l of



                                                                      [place]

 

       Penicill DA     Active U      'AS A CHILD'                       HC

A



       ins                                -16                        [facility]



                                          00:00:                      [facility]



                                          00                          l of



                                                                      [place]

 

       Penicill Propensi Active        Unknown -                Childhood 

[facility]



       ins    ty to                See comments 919                 allergy ity

 of



              adverse                      00:00:                      [place]



              reaction                      00                          Medical



              s                                                       Branch

 

       PENICILL Drug   Active        Unknown-Cmnt                       Un

yung



       INS    Class                       9-19                        ity of



                                          00:00:                      [place]



                                          00                          Medical



                                                                      Branch







Family History







           Family Member Diagnosis  Comments   Start Date Stop Date  Source

 

           Natural father Stroke                                      [facility]

 

           Natural mother Asthma                                      [facility]

 

           Natural mother Heart disease                                  [facility]







Social History







           Social Habit Start Date Stop Date  Quantity   Comments   Source

 

           Exposure to                       Not sure              [facility]



           SARS-CoV-2                                             [facility]



           (event)                                                [facility]

 

           History SDOH                                             [facility]

f



           Alcohol Frequency                                             [place] [facility]

ed[facility]



                                                                  [facility]

 

           History SDOH                                             [facility]

f



           Alcohol Std                                             [facility]



           Drinks                                                 [facility]

 

           History SDOH                                             [facility]

f



           Alcohol Binge                                             [facility]

al



                                                                  Branch

 

           Alcohol intake 2022-10-07 2022-10-07 Current drinker            [facility]

[facility]



                      00:00:00   00:00:00   of alcohol            Hospital



                                            (finding)             

 

           Tobacco use and 2022 Smokeless tobacco            [facility]

[facility]



           exposure   00:00:00   00:00:00   non-user              Hospital

 

           Alcohol Comment 2020 "occasionally"            [facility]

[facility]



                      00:00:00   00:00:00                         Hospital

 

           Sex Assigned At 1955 F                     [facility]



           B[facility]      00:00:00   [facility]:00:00                         [facility]









                Smoking Status  Start Date      Stop Date       Source

 

                Never smoked tobacco                                 [facility]

[facility]







Medications







       Ordered Filled Start  Stop   Current Ordering Indication Dosage Frequency

 Signature

                    Comments            Components          Source



     Medication Medication Date Date Medication? Clinician                (SIG) 

          



     Name Name                                                   

 

     levothyroxi      2022      Yes            75ug QD   Take 75           Met

hodi



     ne        0-07                               mcg by           st



     (SYNTHROID)      08:44:                               mouth           Hospi

ta



     75 mcg      51                                 every           l



     tablet                                         morning.           

 

     simvastatin      2022      Yes            20mg QD   Take 20 mg           

Methodi



     (ZOCOR) 20      0-07                               by mouth           st



     MG tablet      08:44:                               every           Hospita



               51                                 morning.           l

 

     metoprolol      2022- No        [id number] 25mg QD   Take 1           

Method[facility]



     succinate      0-07 10-08                          tablet (25           st



     XL        00:00: 04:59                          mg total)           Hospita



     (TOPROL-XL)      00   :00                           by mouth           l



     25 mg 24 hr                                         daily.           



     tablet                                                        

 

     acetaminoph            Yes            650mg Q6H  Take 650           M

ethodi



     en        8-23                               mg by           st



     (TYLENOL)      13:34:                               mouth           Hospita



     325 MG      53                                 every 6           l



     tablet                                         (six)           



                                                  hours as           



                                                  needed for           



                                                  mild pain           



                                                  or fever.           

 

     sertraline      2023- No             25mg QD   Take 1           [facility]

[facility]



     (Zoloft) 25      8-23 08-24                          tablet (25           s

t



     MG tablet      00:00: 04:59                          mg total)           Ho

spita



               00   :00                           by mouth           l



                                                  daily.           

 

     ibuprofen      2022- No             400mg Q6H  Take 400           Me

thodi



     (ADVIL) 200      6-10 06-10                          mg by           st



     MG tablet      03:07: 00:00                          mouth           Hospit

a



               16   :00                           every 6           l



                                                  (six)           



                                                  hours as           



                                                  needed for           



                                                  mild pain.           

 

     ibuprofen      2022- No             800mg Q6H  Take 1           [facility]

prashant



     (ADVIL) 800      6-08 07-09                          tablet           st



     MG tablet      00:00: 04:59                          (800 mg           Hosp

jose



               00   :00                           total) by           l



                                                  mouth           



                                                  every 6           



                                                  (six)           



                                                  hours as           



                                                  needed for           



                                                  mild pain           



                                                  for up to           



                                                  30 days.           

 

     clindamycin      2022- No             300mg [id number] Take 1       

    Method[facility]



     (CLEOCIN)      -16                     [id number] capsule           

st



     300 MG      00:00: 04:59                     3D   (300 mg           Hospita



     capsule      00   :00                           total) by           l



                                                  mouth 3           



                                                  (three)           



                                                  times a           



                                                  day for 7           



                                                  days.           

 

     ibuprofen      2022- No             800mg Q6H  Take 1           [facility]

[facility]



     (ADVIL) 800      -                          tablet           st



     MG tablet      00:00: 00:00                          (800 mg           Hosp

jose



               00   :00                           total) by           l



                                                  mouth           



                                                  every 6           



                                                  (six)           



                                                  hours as           



                                                  needed for           



                                                  mild pain           



                                                  for up to           



                                                  30 days.           

 

     clindamycin      2022- No             300mg [id number] Take 1       

    [facility]



     (CLEOCIN)                           [id number] capsule           

st



     300 MG      00:00: 00:00                     3D   (300 mg           Hospita



     capsule      00   :00                           total) by           l



                                                  mouth 3           



                                                  (three)           



                                                  times a           



                                                  day for 7           



                                                  days.           

 

     traMADol      2022- No        [id number] 50mg Q6H  Take 50 mg           M

ethodi



     (ULTRAM) 50      4-05 04-05                          by mouth           st



     mg tablet      13:14: 00:00                          every 6           Hosp

jose



               37   :00                           (six)           l



                                                  hours as           



                                                  needed for           



                                                  moderate           



                                                  pain           



                                                  .acute           



                                                  pain.           

 

     simvastatin      2017      Yes            20mg      Take 20 mg           

Univers



     20 mg      0-04                               by mouth           ity of



     tablet      17:59:                               at             [place]



               12                                 bedtime.           Medical



                                                                 Branch

 

     levothyroxi      2017      Yes            50ug      Take 50           Uni

vers



     ne 50 mcg      0-04                               mcg by           ity of



     tablet      17:59:                               mouth           [place]



               12                                 every           Medical



                                                  morning.           Branch

 

     estradiol      2017      Yes            1{patch      Apply 1           Un

yung



     0.1 mg/24      0-04                     }         Patch to           ity of



     hr twice      17:59:                               skin           [place]



     weekly      12                                 weekly.           Medical



     patch                                                        Branch

 

     simvastatin      2017      Yes            20mg      Take 20 mg           

[facility]



     20 mg      0-04                               by mouth           ity of



     tablet      17:59:                               at             [place]



               12                                 bedtime.           Medical



                                                                 Branch

 

     levothyroxi      2017      Yes            50ug      Take 50           Uni

vers



     ne 50 mcg      0-04                               mcg by           ity of



     tablet      17:59:                               mouth           [place]



               12                                 every           Medical



                                                  morning.           Branch

 

     estradiol      2017      Yes            1{patch      Apply 1           Un

yung



     0.1 mg/24      0-04                     }         Patch to           ity of



     hr twice      17:59:                               skin           [place]



     weekly      12                                 weekly.           Medical



     patch                                                        Branch

 

     simvastatin      2017      Yes            20mg      Take 20 mg           

[facility]



     20 mg      0-04                               by mouth           ity of



     tablet      17:59:                               at             [place]



               12                                 bedtime.           Medical



                                                                 Branch

 

     levothyroxi      2017      Yes            50ug      Take 50           Uni

vers



     ne 50 mcg      0-04                               mcg by           ity of



     tablet      17:59:                               mouth           [place]



               12                                 every           Medical



                                                  morning.           Branch

 

     estradiol      2017      Yes            1{patch      Apply 1           Un

yung



     0.1 mg/24      0-04                     }         Patch to           ity of



     hr twice      17:59:                               skin           [place]



     weekly      12                                 weekly.           Medical



     patch                                                        Branch

 

     simvastatin      2017      Yes            20mg      Take 20 mg           

[facility]



     20 mg      0-04                               by mouth           ity of



     tablet      17:59:                               at             [place]



               12                                 bedtime.           Medical



                                                                 Branch

 

     levothyroxi      2017      Yes            50ug      Take 50           Uni

vers



     ne 50 mcg      0-04                               mcg by           ity of



     tablet      17:59:                               mouth           [place]



               12                                 every           Medical



                                                  morning.           Branch

 

     estradiol      2017      Yes            1{patch      Apply 1           Un

yung



     0.1 mg/24      0-04                     }         Patch to           ity of



     hr twice      17:59:                               skin           [place]



     weekly      12                                 weekly.           Medical



     patch                                                        Branch

 

     estradiol      2017      Yes            1{patch      Apply 1           Un

yung



     0.1 mg/24      0-04                     }         Patch to           ity of



     hr twice      12:59:                               skin           [place]



     weekly      12                                 weekly.           Medical



     patch                                                        Branch

 

     simvastatin      2017      Yes            20mg      Take 20 mg           

[facility]



     20 mg      0-04                               by mouth           ity of



     tablet      12:59:                               at             [place]



               12                                 bedtime.           Medical



                                                                 Branch

 

     levothyroxi      2017      Yes            50ug      Take 50           Uni

vers



     ne 50 mcg      0-04                               mcg by           ity of



     tablet      12:59:                               mouth           [place]



               12                                 every           Medical



                                                  morning.           Branch

 

     estradiol      2017      Yes            1{patch      Apply 1           Un

yung



     0.1 mg/24      0-04                     }         Patch to           ity of



     hr twice      12:59:                               skin           [place]



     weekly      12                                 weekly.           Medical



     patch                                                        Branch

 

     simvastatin      2017      Yes            20mg      Take 20 mg           

Univers



     20 mg      0-04                               by mouth           ity of



     tablet      12:59:                               at             [place]



               12                                 bedtime.           Medical



                                                                 Branch

 

     levothyroxi      2017      Yes            50ug      Take 50           Uni

vers



     ne 50 mcg      0-04                               mcg by           ity of



     tablet      12:59:                               mouth           [place]



               12                                 every           Medical



                                                  morning.           Branch







Immunizations







           Ordered Immunization Filled Immunization Date       Status     Commen

ts   Source



           Name       Name                                        

 

           MODERNA COVID-19            2022 Completed             [facility]

t



           MRNA VACCINATION            00:00:00                         [facility]

 

           PFIZER COVID-19 MRNA            2021 Completed             [facility]

odist



           VACCINATION            00:00:00                         [facility]

 

           PFIZER COVID-19 MRNA            2021 Completed             [facility]

odist



           VACCINATION            00:00:00                         Hospital







Vital Signs







             Vital Name   Observation Time Observation Value Comments     Source

 

             Systolic blood 2022-10-07 13:43:00 152 mm[Hg]                [facility]



             pressure                                            

 

             Diastolic blood 2022-10-07 13:43:00 88 mm[Hg]                 [facility]



             pressure                                            

 

             Heart rate   2022-10-07 13:43:00 86 /min                   [facility]

 

             Body height  2022-10-07 13:43:00 160 cm                    [facility]

 

             Body weight  2022-10-07 13:43:00 67.132 kg                 [facility]

 

             BMI          2022-10-07 13:43:00 26.22 kg/m2               [facility]

 

             Oxygen saturation in 2022-10-07 13:43:00 98 /min                   

[facility]



             Arterial blood by                                        



             Pulse oximetry                                        

 

             Body temperature 2022 17:53:00 36.61 Sade                 [facility]

 

             Respiratory rate 2022 17:53:00 16 /min                   [facility]







Procedures







                Procedure       Date / Time     Performing Clinician Source



                                Performed                       

 

                ECG 12-LEAD     2022-10-07 13:44:04 Sandi Mckenzie  [facility]

 

                SURGICAL PATHOLOGY 2022 18:30:00 [facility]



                REQUEST                                         

 

                KS AN ELECTIVE  2022 13:11:00 Cheyanne Louie [facility]



                ENDOTRACHEAL AIRWAY                                 

 

                EXCISION, LESION, FACE OR 2022 12:56:00 [facility] Derrek [facility]



                HEAD                                            

 

                TYPE AND SCREEN 2022 19:00:00 [facility]

 

                HEMOGLOBIN A1C  2022 19:00:00 [facility]

 

                PARTIAL THROMB[facility]LASTIN [facility]



                TIME (PTT)                                      

 

                PROTHROMBIN TIME WITH INR 2022 19:00:00 [facility]

 

                COMPREHENSIVE METABOLIC 2022 19:00:00 Keeley Topete

[facility]



                PANEL                                           

 

                CBC WITH PLATELET AND 2022 19:00:00 Keeley Topete [facility]



                DIFFERENTIAL                                    

 

                ESTIMATED GFR   2022 19:00:00 Keeley Topete [facility]

 

                ECG PRE/POST OP 2022 18:50:12 Keeley Topete [facility]

 

                ASSIGNMENT OF BENEFITS 2022-02-15 14:06:06 Doctor [facility]igned, Un

[facility]



                                                [place]         [facility]

 

                US ABDOMEN COMPLETE 2020 13:44:04 Jeanne Justin [facility]               Medical Branch

 

                ASSIGNMENT OF BENEFITS 2020 16:51:14 Doctor Unassigned, Un

[facility]



                                                [place]         [facility]

 

                [id number]         2020 00:00:00 [facility]

 

                [id number]         2020 00:00:00 [facility]

 

                [id number]         2020 00:00:00 [facility]

 

                [id number]         2020 00:00:00 [facility]

 

                [id number]         2020 00:00:00 [facility]

 

                [id number]         2020 00:00:00 [facility]

 

                [id number]         2020 00:00:00 [facility]







Plan of Care







             Planned Activity Planned Date Details      Comments     Source

 

             Future Scheduled 2023   Hepatitis C screening              [facility]



             Test         09:24:08     (procedure) [code =              



                                       911625526]                

 

             Future Scheduled 2023   BREAST CANCER              [facility]



             Test         09:24:08     SCREENING [code =              



                                       BREAST CANCER              



                                       SCREENING]                

 

             Future Scheduled 2023   COLONOSCOPY SCREENING              [facility]



             Test         09:24:08     [code = COLONOSCOPY              



                                       SCREENING]                

 

             Future Scheduled 2023   SHINGLES VACCINES (1              Met

[facility]



             Test         09:24:08     of 2) [code = SHINGLES              



                                       VACCINES (1 of 2)]              

 

             Future Scheduled 2023   65+ PNEUMOCOCCAL              [facility]

 Hospital



             Test         09:24:08     VACCINE (1 - PCV)              



                                       [code = 65+               



                                       PNEUMOCOCCAL VACCINE              



                                       (1 - PCV)]                

 

             Future Scheduled 2023   COVID-19 VACCINE (4 -              Me

[facility]



             Test         09:24:08     Booster for Pfizer              



                                       series) [code =              



                                       COVID-19 VACCINE (4 -              



                                       Booster for Pfizer              



                                       series)]                  







Encounters







        Start   End     Encounter Admission Attending Care    Care    Encounter 

Source



        Date/Time Date/Time Type    Type    Clinicians Facility Department ID   

   

 

        2022-10-07 2022-10-07 Office          [place], 1.2.840.1 [id number]

[id number] [facility]



        09:00:00 09:17:48 Visit           Sandi   77196.1.1         [address]     [address]



                                                3.430.2.7                 Hospit

a



                                                .3.202153                 l



                                                .8                      

 

        2022-10-07 2022-10-07 Outpatient                 [facility]     [id number]

[address] [place]



        00:00:00 00:00:00                                         679     Method

i



                                                                        st

 

        2022-10-07 2022-10-07 Travel                  1.2.840.1 1.2.607.270 [id number]

[id number] [facility]



        00:00:00 00:00:00                         74370.1.1 350.1.13.43 553     

st



                                                3.430.2.7 0.2.7.3.698         Ho

spita



                                                .3.854056 084.8           l



                                                .8                      

 

        2022 Refill          [facility], 1.2.840.1 [id number]

3575 [facility]



        00:00:00 00:00:00                 Derrek VIVEROS 07729.1.1         164     s

t



                                                3.430.2.7                 Hospit

a



                                                .3.484434                 l



                                                .8                      

 

        2022 Office          [facility], 1.2.840.1 [id number]

7858 [facility]



        14:30:00 14:51:23 Visit           Derrek VIVEROS 36558.1.1         215     s

t



                                                3.430.2.7                 Hospit

a



                                                .3.075405                 l



                                                .8                      

 

        2022 Office          [place], 1.2.840.1 [id number]

7858 [facility]



        14:00:00 14:50:51 Visit           Dolores   50723.1.1         [address]     st



                                                3.430.2.7                 Hospit

a



                                                .3.403446                 l



                                                .8                      

 

        2022 Outpatient         [place], [facility]     [id number]

[address] [place]



        00:00:00 00:00:00                 DOLORES                   214     Method

i



                                                                        st

 

        2022 Outpatient         [facility], [facility]     [id number]

[address] [place]



        00:00:00 00:00:00                 DERREK                 215     Method

i



                                                                        st

 

        2022 Travel                  1.2.840.1 1.2.372.773 [id number]

[id number] [facility]



        00:00:00 00:00:00                         36495.1.1 350.1.13.43 279     

st



                                                3.430.2.7 0.2.7.3.698         Ho

spita



                                                .3.118090 084.8           l



                                                .8                      

 

        2022 Office          [facility] 1.2.840.1 [id number]

3168 Method[facility]



        13:00:00 13:22:18 Visit           Derrek SHABAZZIdalmis 25188.1.1         635     s

t



                                                3.430.2.7                 Hospit

a



                                                .3.456411                 l



                                                .8                      

 

        2022 Outpatient         [facility], [facility]     [id number]

[address] [place]



        00:00:00 00:00:00                 DERREK                 635     Method

i



                                                                        st

 

        2022 [facility], 1.2.840.1 [id number]

02456 [facility]



        05:52:00 23:59:00 Encounter         Derrek HARIKAIdalmis 88635.1.1         448    

 st



                                                3.430.2.7                 Hospit

a



                                                .3.693946                 l



                                                .8                      

 

        2022 Anesthesia         Brenda Dow 1.2.840.

1 [id number] 

[id number]                              Method[facility]



        07:53:00 11:40:00 Event           Keeley Topete 78569.1.1         5

23     st



                                                3.430.2.7                 Hospit

a



                                                .3.372189                 l



                                                .8                      

 

        2022 Surgery         [place], 1.2.840.1 [id number]

3206 [facility]



        08:00:00 10:25:00                 Derrek SHABAZZIdalmis 53811.1.1         420     s

t



                                                3.430.2.7                 Hospit

a



                                                .3.469339                 l



                                                .8                      

 

        2022 Outpatient         [facility], [facility]     021     [id number]

[address] [place]



        00:00:00 00:00:00                 DERREK                 448     Method

i



                                                                        st

 

        2022 Travel                  1.2.840.1 1.2.190.531 1703

725029 [facility]



        00:00:00 00:00:00                         77470.1.1 350.1.13.43 844     

st



                                                3.430.2.7 0.2.7.3.698         Ho

spita



                                                .3.540008 084.8           l



                                                .8                      

 

        2022 [facility]Admiss         [id number]

[id number] 

[id number]                              [facility]



        13:00:00 14:00:00 Keeley Schultz 03062.1.1         1

11     st



                        Testing                 3.430.2.7                 Hospit

a



                                                .3.471720                 l



                                                .8                      

 

        2022 Outpatient         [facility], [facility]     [id number]

[address] [place]



        00:00:00 00:00:00                 DERREK                 111     Method

i



                                                                        st

 

        2022 Travel                  1.2.840.1 1.2.245.804 0113

596583 [facility]



        00:00:00 00:00:00                         91291.1.1 350.1.13.43 885     

st



                                                3.430.2.7 0.2.7.3.698         Ho

spita



                                                .3.483455 084.8           l



                                                .8                      

 

        2022 Bridger Churcihll, 1.2.840.1 [id number]

[id number] [facility]



        00:00:00 00:00:00                 Bailey 96869.1.1         649     st



                                                3.430.2.7                 Hospit

a



                                                .3.915230                 l



                                                .8                      

 

        2022 Procedure                 1.2.840.1 [id number] 

[id number] [facility]



        14:00:00 16:46:11 visit                   43513.1.1         868     st



                                                3.430.2.7                 Hospit

a



                                                .3.125687                 l



                                                .8                      

 

        2022 Office          [facility], 1.2.840.1 [id number] 2100 Method[facility]



        13:15:00 16:14:32 Visit           Derrek SHABAZZ. 65904.1.1         793     s

t



                                                3.430.2.7                 Hospit

a



                                                .3.853190                 l



                                                .8                      

 

        2022 Outpatient         [place], [facility]     [id number]

[address] [place]



        00:00:00 00:00:00                 DERREK                 793     Method

i



                                                                        st

 

        2022 Outpatient                 [facility]     [id number]

[address] [place]



        00:00:00 00:00:00                                         868     Method

i



                                                                        st

 

        2022 Travel                  1.2.840.1 1.2.481.229 4323

063456 [facility]



        00:00:00 00:00:00                         24003.1.1 350.1.13.43 626     

st



                                                3.430.2.7 0.2.7.3.698         Ho

spita



                                                .3.936122 084.8           l



                                                .8                      

 

        2022 Outpatient KARLA PRETTY [facility]   

 [id number] [facility]



        09:20:00 09:20:00                 KARLA RUVALCABA [facility]

 

        2022-02-15 2022-02-15 Outpatient KARLA PRETTY [facility]   

 [id number] [facility]



        08:00:00 09:44:15                 KARLA RUVALCABA                       

  jr [facility]

 

        2022-02-15 2022-02-15 Orders          Doctor WILCOX    1.2.840.114 [id number]

[id number] [facility]



        00:00:00 00:00:00 Only            Unassigned, SURYA   350.1.13.10       

  ity of



                                        [facility] 4.2.7.2.686         [place]

as



                                                        [phone]         [address]

 

        2022-02-15 2022-02-15 Letter          Doctor  JERI    1.2.840.114 [id number]

[id number] [facility]



        00:00:00 00:00:00 (Out)           Unassigned, SURYA   350.1.13.10       

  ity of



                                        [facility] 4.2.7.2.686         [place]

as



                                                        [phone]         Medi

korina



                                                        044             Branch

 

        2022 Outpatient         [place], [facility]     [id number]

[address] [place]



        00:00:00 00:00:00                 DERREK                 486     Method

i



                                                                        

 

        2022 Outpatient         [place], [facility]     [id number]

[address] [place]



        00:00:00 00:00:00                 DOLORES                   778     Method

i



                                                                        

 

        2022 Outpatient         [place], [facility]     [id number]

[address] [place]



        00:00:00 00:00:00                 DERREK                 779     Method

i



                                                                        

 

        2021 Outpatient         [place], [facility]     [id number]

[address] [place]



        00:00:00 00:00:00                 DERREK                 266     Method

i



                                                                        

 

        2021 Outpatient         [place], [facility]     [id number]

[address] [place]



        00:00:00 00:00:00                 DERREK                 882     Method

i



                                                                        

 

        2021 Outpatient         ROBBEN, [facility]     [id number]

[address] [place]



        00:00:00 00:00:00                 TAVO Burden     Me

thodi



                                                                        

 

        2021 Outpatient                 [facility]     [id number]

[address] [place]



        00:00:00 00:00:00                                         770     Method

i



                                                                        

 

        2020 [facility]    1.2.840.114 [id number]

[id number] 



        08:00:00 23:59:00 Jeanne Guzmán 350.1.13.10     

    



                                                [place] 4.2.7.2.686         



                                                [place]  [phone]         



                                                        806             

 

        2020 [facility]    1.2.840.114 [id number]

[id number] [facility]



        08:00:00 23:59:00 Encounter         e, Nizar C [place] 350.1.13.10     

    ity of



                                                [place] 4.2.7.2.686         [place]

[place]



                                                [place]  [phone]         Medi

korina



                                                        806             [place]

 

        2020 Outpatient R       [facility]    102

[id number] Univers



        00:00:00 00:00:00                 JEANNE FABIAN                         javi carballo

susanne



                                                                        [facility]

 

        2020 Technician         Kaveh, [facility]    1.2.8

40.114 [id number] 

Univers



        11:56:21 12:34:48 Visit           Jeanne Justin [place] 350.1.1

3.10         ity of



                                                [place] 4.2.7.2.686         [place]

[place]



                                                [place] [phone]         Me

dical



                                                [address]                 

 

        2020 Technician         Kaveh, [facility]    1.2.840.114 [id number]

[id number] 



        11:56:21 12:34:48 Visit           Lab Main [place] 350.1.13.10         



                                                [place] 4.2.7.2.686         



                                                [place] [phone]         



                                                [address]                 

 

        2020 Outpatient R       [facility]    102

[id number] Univers



        11:45:00 11:45:00                 JEANNE FABIAN                         javi carballo

susanne



                                                                        [facility]

 

        2020 Orders          Doctor  JERI    1.2.840.114 [id number]

[id number] Univers



        00:00:00 00:00:00 Only            Unassigned, SURYA   350.1.13.10       

  ity of



                                        [facility] 4.2.7.2.686         [place]

as



                                                        [phone]         Medi

korina



                                                        009             [place]

 

        2020 Outpatient         READER, [facility]     [id number]

[address] [place]



        00:00:00 00:00:00                 [place]                 051     Method

i



                                                                        

 

        2020 Outpatient         SUPA, [facility]     [id number]

[address] [place]



        00:00:00 00:00:00                 BONY                   807     Method

i



                                                                        

 

        2020 Outpatient         [facility]    F000

[id number] [facility]



        07:30:00 07:30:00                                         15      Woman'

s



                                                                        Hospita



                                                                        l of



                                                                        [place]

 

        2020 Outpatient         KLEIN, FLOWER [facility]     2100

[id number] [place]



        00:00:00 00:00:00                                         644     Method

i



                                                                        st

 

        2020 Outpatient         KLEIN, FLOWER [facility]     

[id number] [place]



        00:00:00 00:00:00                                         481     Method

i



                                                                        st

 

        2020 Outpatient         KLEIN, [facility]     

[id number] [place]



        00:00:00 00:00:00                                         984     Method

i



                                                                        st

 

        2020 Outpatient         KLEIN, FLOWER [facility]     

[id number] [place]



        00:00:00 00:00:00                                         725     Method

i



                                                                        st







Results







           Test Description Test Time  Test Comments Results    Result Comments 

Source









                    ECG 12 lead         2022 23:04:42 









                      Test Item  Value      Reference Range Interpretation Comme

nts









             Ventricular rate (test code = 253)                                 

       

 

             Atrial rate (test code = 255)                                      

  

 

             KS interval (test code = 266)                                      

  

 

             QRSD interval (test code = 260)                                    

    

 

             QT interval (test code = 264)                                      

  

 

             QTC interval (test code = 265)                                     

   

 

             P axis 1 (test code = 267)                                        

 

             QRS axis 1 (test code = 268)                                       

 

 

             T wave axis (test code = 270)                                      

  

 

             EKG impression (test code = 273) Normal sinus rhythm-Low voltage QR

S-Septal                           



                          infarct , age undetermined-Abnormal ECG-In            

               



                          automated comparison with ECG of 18-MAY-2022          

                 



                          13:50,-Septal infarct is now                          

 



                          present-Electronically Signed By Sandi Mckenzie MD () on 2022 5:04:41 PM                 

          



[facility] 13[facility]59:51





             Test Item    Value        Reference Range Interpretation Comments

 

             Case number (test code = [id number]                           



             [id number])                                            

 

             Surgical pathology See link below for                           



             report (test code = PDF Lab Report                           



             2255)                                               

 

             Result status (test code This is Final Report                      

     



             = [id number])   for [id number]                           



[facility] [id number] 00:09:01





             Test Item    Value        Reference Range Interpretation Comments

 

             Ventricular rate (test                                        



             code = 253)                                         

 

             Atrial rate (test code                                        



             = 255)                                              

 

             KS interval (test code                                        



             = 266)                                              

 

             QRSD interval (test                                        



             code = 260)                                         

 

             QT interval (test code                                        



             = 264)                                              

 

             QTC interval (test code                                        



             = 265)                                              

 

             P axis 1 (test code =                                        



             267)                                                

 

             QRS axis 1 (test code =                                        



             268)                                                

 

             T wave axis (test code                                        



             = 270)                                              

 

             EKG impression (test Normal sinus                           



             code = 273)  rhythm-Low voltage                           



                          QRS-Borderline ECG-In                           



                          automated comparison                           



                          with ECG of                            



                          2020 11:49,-No                           



                          significant change was                           



                          found-Electronically                           



                          Signed By Ondina Phillips MD ([id number]) on                           



                          2022 7:08:59 PM                           



[facility] [id number] 16:06:50 1. ?No cholelithiasis 
or sonographic evidence of acute cholecystitis.Unremarkable ultrasound of the
abdomen. Preliminary Report Dictated by Resident: Nuno Triplett MD., have reviewed this study and agree with theabove report.EXAM: US 
ABDOMEN COMPLETE HISTORY: 65 years-old Female with Recurring abdominal pain . 
TECHNIQUE: Complete abdominal ultrasound was performed. Main portal veinwas 
evaluated with color Doppler imaging. Representative images wereobtained for the
record. COMPARISON: [facility] CT chest abdomen pelvis with contrast 
2020 FINDINGS: LIVER: Length: 14.8 cm.Parenchyma: Normal hepatic 
echogenicity and echotexture. No focal lesion isdetected.Portal vein: 
Hepatopetal flow present in the main portal vein. The mainportal vein measures 
0.9 cm in diameter. GALLBLADDER:No cholelithiasis.Normal gallbladder wall 
thickness, 2 mm.Negative Moser's sign. BILE DUCTS:No intra- or extrahepatic 
biliary dilatation..Common Duct diameter: 5 mm. PANCREAS: The visualized head 
and body of the pancreas is unremarkable. Noperipancreatic fluid collection. 
Pancreatic duct is not dilated. SPLEEN: The spleen is normal in size. The spleen
measures 9.5 x 3.7 x 3.4 cm. Nofocal lesions in the spleen. KIDNEYS:RIGHT:Size: 
10.1 x 3.2 x 4.4 cm.Parenchyma: Normal renal cortical echogenicity and 
thickness. No focalsolid or cystic renal lesions are detected.Collecting System:
No hydronephrosis. LEFT:Size: 10.1 x 4.6 x 4.3 cm.Parenchyma:Normal renal 
cortical echogenicity and thickness. No focal solidor cystic renal lesions are 
detected.Collecting System: No hydronephrosis. AORTA:Abdominal aorta is normal 
in caliber where visualized. The proximal aortameasures 2.0 cm in diameter. IVC
:IVC is normal in appearance where visualized. [facility], Radiant Results Inft User -
2020 11:07 AMCDTEXAM: US ABDOMEN COMPLETEHISTORY: 65 years-old Female with
Recurring abdominal pain .TECHNIQUE: Complete abdominal ultrasound was 
performed. Main portal veinwas evaluated with color Doppler imaging.
Representative images wereobtained for the record.COMPARISON: [facility] CT 
chest abdomen pelviswith contrast 2020FINDINGS: LIVER: Length: 14.8 
cm.Parenchyma: Normal hepatic echogenicity and echotexture. No focal lesion 
isdetected.Portal vein: Hepatopetal flow present in the main portal vein.The 
mainportal vein measures 0.9 cm in diameter.GALLBLADDER:No cholelithiasis.Normal
gallbladder wall thickness, 2 mm.Negative Moser's sign.BILE DUCTS:No intra- or 
extrahepatic biliary dilatation..Common Duct diameter: 5 mm.PANCREAS: The 
visualized head and body of the pancreas is unremarkable. Noperipancreatic fluid
collection. Pancreatic duct is not dilated.SPLEEN: The spleen is normal in size.
The spleen measures 9.5 x 3.7 x 3.4 cm. Nofocal lesions in the 
spleen.KIDNEYS:RIGHT:Size: 10.1 x 3.2 x 4.4 cm.Parenchyma: Normal renal cortical
echogenicity and thickness. No focalsolid or cystic renal lesions are 
detected.Collecting System: No hydronephrosis.LEFT:Size: 10.1 x 4.6 x 4.3 
cm.Parenchyma:Normal renal cortical echogenicity and thickness. No focal solidor
cystic renal lesions are detected.Collecting System: No 
hydronephrosis.AORTA:Abdominal aorta is normal in caliber where visualized. The 
proximal aortameasures 2.0 cm in diameter.IVC:IVC is normal in appearance where 
visualized. IMPRESSION1. No cholelithiasis or sonographic evidence of acute 
cholecystitis.Unremarkable ultrasound of the abdomen.Preliminary Report Dictated
by Resident: Nuno Pitts MD., have reviewed this 
study and agree with theabove report.[facility] CampusCHEMISTRY
7 [id number] 06:07:00





             Test Item    Value        Reference Range Interpretation Comments

 

             SODIUM (test code = NA) 142 mEq/L    135-145      N            

 

             POTASSIUM (test code = K) 4.5 mEq/L    3.5-5.0      N            

 

             CHLORIDE (test code = CL) 106 mEq/L    100-115      N            

 

             CARBON DIOXIDE (test code = CO2) 31 mEq/L     22-31        N       

     

 

             ANION GAP (test code = GAP) 9.70         10-20        L            

 

             GLUCOSE (test code = GLU) 96 mg/dL            N            

 

             BLOOD UREA NITROGEN (test code = 6 mg/dL      7-18         L       

     



             BUN)                                                

 

             GLOMERULAR FILTRATION RATE (test 72 ml/min    >60          N       

     



             code = GFR)                                         

 

             CREATININE (test code = CREAT) 0.8 mg/dL    0.5-1.0      N         

   

 

             CALCIUM (test code = CA) 8.3 mg/dL    8.4-10.2     L            



[id number] 06:07:00





             Test Item    Value        Reference Range Interpretation Comments

 

             MAGNESIUM (test code = MAG) 1.5 mg/dL    1.8-2.4      L            



CBC W/AUTO [id number] 05:37:00





             Test Item    Value        Reference Range Interpretation Comments

 

             WHITE BLOOD CELL (test code = WBC) 9.3 K/mm3    6.6-12.1     N     

       

 

             RED BLOOD CELL (test code = RBC) 3.70 M/mm3   3.45-5.01    N       

     

 

             HEMOGLOBIN (test code = HGB) 11.2 g/dL    10.7-13.9    N           

 

 

             HEMATOCRIT (test code = HCT) 35.0 %       32.1-42.1    N           

 

 

             MEAN CELL VOLUME (test code = MCV) 95 fL        84.1-94.8    H     

       

 

             MEAN CELL HGB (test code = MCH) 30.3 pg      27-35        N        

    

 

             MEAN CELL HGB CONCETRATION (test 32.0 gm/dL   32.2-34.1    L       

     



             code = MCHC)                                        

 

             RED CELL DISTRIBUTION WIDTH (test 13.3 %       12.4-16.5    N      

      



             code = RDW)                                         

 

             PLATELET COUNT (test code = PLT) 190 K/mm3    133-385      N       

     

 

             MEAN PLATELET VOLUME (test code = 10.6 fl      9.1-12.7     N      

      



             MPV)                                                

 

             NEUTROPHIL % (test code = NT%) 74.8 %       56.5-79.4    N         

   

 

             LYMPHOCYTE % (test code = LY%) 17.3 %       14.3-34.3    N         

   

 

             MONOCYTE % (test code = MO%) 5.7 %        5.1-10.4     N           

 

 

             EOSINOPHIL % (test code = EO%) 1.4 %        0.1-3.0      N         

   

 

             BASOPHIL % (test code = BA%) 0.5 %        0.1-1.0      N           

 

 

             NEUTROPHIL # (test code = NT#) 6.9 K/mm3                           

   

 

             LYMPHOCYTE # (test code = LY#) 1.6 K/mm3                           

   

 

             MONOCYTE # (test code = MO#) 0.5 K/mm3                             

 

 

             EOSINOPHIL # (test code = EO#) 0.13 K/mm3                          

   

 

             BASOPHIL # (test code = BA#) 0.1 K/mm3                             

 

 

             RBC MORPHOLOGY REQUIRED (test code NORMAL       NORMAL             

       



             = RBCM)                                             

 

             PLATELET MORPHOLOGY REQUIRED (test NORMAL       NORMAL             

       



             code = PLTMR)                                        



SMALL INTESTINE,[id number] 19:07:00
--------------------------------------------------------------------------------

------------RUN DATE: 20 [facility] PAGE 1 RUN TIME: 1111 
Specimen Inquiry RUN USER: INTERFACE  ----------
--------------------------------------------------------------------------------

--PATIENT: ABBY BRITO ACCT #: [id number] LOC:  U #: [id number] AGE/SX: 
64/F ROOM: Counts include 234 beds at the [facility] RE20REG DR: Flower Klein MD : 55 BED: A  DIS:
20 STATUS: DIS IN TLOC: ---------------------------------
----------------------------------------------------------- SPEC #: 
[id number] RECD:  STATUS: ABDI MUSA #: [id number] MELISA: 20- 
SUBM DR: Flower Klein MD ENTERED:  SP TYPE: SMALLINTBX OT DR: 
ORDERED: LEVEL IV CODES: [id number] - SMALL INTESTINE PROCEDURES: LEVEL IV (Incompl
ete) TISSUES: SMALL INTESTINE, NOS - RECTO-SIGMOID CLINICAL HISTORY 64 year old,
diverticulitis (wpd) FINAL DIAGNOSIS Designated "rectosigmoid", segmental 
resection: - segment of rectosigmoid colon with diverticular disease associated 
with peridiverticular abscesses and fibrosis - margins - unremarkable - no 
dysplasia or neoplasia identified - rectosigmoid donuts - no pathologic 
alterations CPT code(s): 01638 [facility]/wpd 20 GROSS DESCRIPTION ANATOMIC 
SOURCE OF TISSUE (per Requisition): Rectosigmoid The specimen is received in a 
formalin-filled container, labeled with the patient's name and designated 
"rectosigmoid". The specimen consists of a 16 cm in length and 4.0 cm in 
circumference segment of large bowel with attached pericolonic adipose tissue. 
Additionally received in the same container are two unremarkable ring shaped 
segments of bowel, 2.0 and 2.5 cm. The serosa is pink-purple and hyperemic with 
multiple adhesions. The lumen is strictured. The mucosa is tan-pink, focally 
hemorrhagic and edematous with multiple diverticula, 0.3 - 1.0 cm. The 
muscularis propria is tan, firm and thickened. There is no perforation site. 
However, directly adjacent to one diverticulum, there is a 1.3 cm abscess. 
Representative sections submitted as A1 - A5, with diverticular disease as A1 - 
A4 and ty A5. /wpd 20 ** CONTINUED ON NEXT PAGE ** 
-------------------------------------------------
-------------------------------------------RUN DATE: 20 [facility] PAGE 2 RUN TIME: 1111 Specimen Inquiry RUN USER: INTERFACE 
------------------------------------------------------------
--------------------------------SPEC #: [id number] PATIENT: ABBY BRITO 
#[id number] (Continued)
--------------------------------------------------------------------------------

------------ MICROSCOPIC DESCRIPTION The specimen consists of a segment of 
rectosigmoid colon containing diverticular disease with foci of peridiverticular
abscesses and fibrosis. No dysplasia or neoplasia is identified. The margins of 
resection and the donuts are unremarkable. billy/hamilton 
20-----------------------------
--------------------------------------------------------------- Signed 
________________________________ Jing Devine 20 1907 
---------------------------------------------------------------------
----------------------- ** END OF REPORT **CBC W/AUTO [id number] 07:36:00





             Test Item    Value        Reference Range Interpretation Comments

 

             WHITE BLOOD CELL (test code = WBC) 12.2 K/mm3   6.6-12.1     H     

       

 

             RED BLOOD CELL (test code = RBC) 3.65 M/mm3   3.45-5.01    N       

     

 

             HEMOGLOBIN (test code = HGB) 11.0 g/dL    10.7-13.9    N           

 

 

             HEMATOCRIT (test code = HCT) 35.0 %       32.1-42.1    N           

 

 

             MEAN CELL VOLUME (test code = MCV) 96 fL        84.1-94.8    H     

       

 

             MEAN CELL HGB (test code = MCH) 30.1 pg      27-35        N        

    

 

             MEAN CELL HGB CONCETRATION (test 31.4 gm/dL   32.2-34.1    L       

     



             code = MCHC)                                        

 

             RED CELL DISTRIBUTION WIDTH (test 13.6 %       12.4-16.5    N      

      



             code = RDW)                                         

 

             PLATELET COUNT (test code = PLT) 187 K/mm3    133-385      N       

     

 

             MEAN PLATELET VOLUME (test code = 11.4 fl      9.1-12.7     N      

      



             MPV)                                                

 

             NEUTROPHIL % (test code = NT%) 80.2 %       56.5-79.4    H         

   

 

             LYMPHOCYTE % (test code = LY%) 14.5 %       14.3-34.3    N         

   

 

             MONOCYTE % (test code = MO%) 4.5 %        5.1-10.4     L           

 

 

             EOSINOPHIL % (test code = EO%) 0.4 %        0.1-3.0      N         

   

 

             BASOPHIL % (test code = BA%) 0.2 %        0.1-1.0      N           

 

 

             NEUTROPHIL # (test code = NT#) 9.8 K/mm3                           

   

 

             LYMPHOCYTE # (test code = LY#) 1.8 K/mm3                           

   

 

             MONOCYTE # (test code = MO#) 0.6 K/mm3                             

 

 

             EOSINOPHIL # (test code = EO#) 0.05 K/mm3                          

   

 

             BASOPHIL # (test code = BA#) 0.0 K/mm3                             

 

 

             RBC MORPHOLOGY REQUIRED (test code NORMAL       NORMAL             

       



             = RBCM)                                             

 

             PLATELET MORPHOLOGY REQUIRED (test NORMAL       NORMAL             

       



             code = PLTMR)                                        



CHEMISTRY 7 [id number] 07:31:00





             Test Item    Value        Reference Range Interpretation Comments

 

             SODIUM (test code = NA) 142 mEq/L    135-145      N            

 

             POTASSIUM (test code = K) 4.6 mEq/L    3.5-5.0      N            

 

             CHLORIDE (test code = CL) 107 mEq/L    100-115      N            

 

             CARBON DIOXIDE (test code = CO2) 31 mEq/L     22-31        N       

     

 

             ANION GAP (test code = GAP) 9.00         10-20        L            

 

             GLUCOSE (test code = GLU) 95 mg/dL            N            

 

             BLOOD UREA NITROGEN (test code = 7 mg/dL      7-18         N       

     



             BUN)                                                

 

             GLOMERULAR FILTRATION RATE (test 72 ml/min    >60          N       

     



             code = GFR)                                         

 

             CREATININE (test code = CREAT) 0.8 mg/dL    0.5-1.0      N         

   

 

             CALCIUM (test code = CA) 8.2 mg/dL    8.4-10.2     L            



[id number] 07:31:00





             Test Item    Value        Reference Range Interpretation Comments

 

             MAGNESIUM (test code = MAG) 1.7 mg/dL    1.8-2.4      L            



CBC W/AUTO [id number] 07:33:00





             Test Item    Value        Reference Range Interpretation Comments

 

             WHITE BLOOD CELL (test code = WBC) 17.9 K/mm3   6.6-12.1     H     

       

 

             RED BLOOD CELL (test code = RBC) 3.94 M/mm3   3.45-5.01    N       

     

 

             HEMOGLOBIN (test code = HGB) 12.1 g/dL    10.7-13.9    N           

 

 

             HEMATOCRIT (test code = HCT) 36.7 %       32.1-42.1    N           

 

 

             MEAN CELL VOLUME (test code = MCV) 93 fL        84.1-94.8    N     

       

 

             MEAN CELL HGB (test code = MCH) 30.7 pg      27-35        N        

    

 

             MEAN CELL HGB CONCETRATION (test 33.0 gm/dL   32.2-34.1    N       

     



             code = MCHC)                                        

 

             RED CELL DISTRIBUTION WIDTH (test 13.2 %       12.4-16.5    N      

      



             code = RDW)                                         

 

             PLATELET COUNT (test code = PLT) 214 K/mm3    133-385      N       

     

 

             MEAN PLATELET VOLUME (test code = 11.2 fl      9.1-12.7     N      

      



             MPV)                                                

 

             MANUAL DIFF REQUIRED (test code = YES                              

      



             MDIFF)                                              

 

             RBC MORPHOLOGY REQUIRED (test code NORMAL       NORMAL             

       



             = RBCM)                                             

 

             PLATELET MORPHOLOGY REQUIRED (test NORMAL       NORMAL             

       



             code = PLTMR)                                        



WBC [id number] 07:33:00





             Test Item    Value        Reference Range Interpretation Comments

 

             TOTAL CELLS COUNTED (test code = 100 #CELLS                        

     



             TCC)                                                

 

             SEGMENTED NEUTROPHILS (test code = 88 %         56.5-79.4    H     

       



             SEG)                                                

 

             LYMPHOCYTE (test code = LYMPH) 6 %          20-40        L         

   

 

             MONOCYTE (test code = MON) 4 %          0-8          N            

 

             EOSINOPHIL (test code = EOS) 2 %          0-4          N           

 

 

             PLATELET ESTIMATE (test code = ADEQUATE     ADEQ                   

   



             PLTEST)                                             

 

             PLATELET MORPHOLOGY (test code = NORMAL       NORMAL               

     



             PLTMORPH)                                           



CHEMISTRY 7 [id number] 05:51:00





             Test Item    Value        Reference Range Interpretation Comments

 

             SODIUM (test code = NA) 140 mEq/L    135-145      N            

 

             POTASSIUM (test code = K) 4.5 mEq/L    3.5-5.0      N            

 

             CHLORIDE (test code = CL) 104 mEq/L    100-115      N            

 

             CARBON DIOXIDE (test code = CO2) 27 mEq/L     22-31        N       

     

 

             ANION GAP (test code = GAP) 13.70        10-20        N            

 

             GLUCOSE (test code = GLU) 161 mg/dL           H            

 

             BLOOD UREA NITROGEN (test code = 11 mg/dL     7-18         N       

     



             BUN)                                                

 

             GLOMERULAR FILTRATION RATE (test 72 ml/min    >60          N       

     



             code = GFR)                                         

 

             CREATININE (test code = CREAT) 0.8 mg/dL    0.5-1.0      N         

   

 

             CALCIUM (test code = CA) 8.7 mg/dL    8.4-10.2     N            



[id number] 05:51:00





             Test Item    Value        Reference Range Interpretation Comments

 

             MAGNESIUM (test code = MAG) 1.7 mg/dL    1.8-2.4      L            



CBC W/AUTO [id number] 05:33:00





             Test Item    Value        Reference Range Interpretation Comments

 

             WHITE BLOOD CELL (test code = WBC) 17.9 K/mm3   6.6-12.1     H     

       

 

             RED BLOOD CELL (test code = RBC) 3.94 M/mm3   3.45-5.01    N       

     

 

             HEMOGLOBIN (test code = HGB) 12.1 g/dL    10.7-13.9    N           

 

 

             HEMATOCRIT (test code = HCT) 36.7 %       32.1-42.1    N           

 

 

             MEAN CELL VOLUME (test code = MCV) 93 fL        84.1-94.8    N     

       

 

             MEAN CELL HGB (test code = MCH) 30.7 pg      27-35        N        

    

 

             MEAN CELL HGB CONCETRATION (test 33.0 gm/dL   32.2-34.1    N       

     



             code = MCHC)                                        

 

             RED CELL DISTRIBUTION WIDTH (test 13.2 %       12.4-16.5    N      

      



             code = RDW)                                         

 

             PLATELET COUNT (test code = PLT) 214 K/mm3    133-385      N       

     

 

             MEAN PLATELET VOLUME (test code = 11.2 fl      9.1-12.7     N      

      



             MPV)                                                

 

             MANUAL DIFF REQUIRED (test code = YES                              

      



             MDIFF)                                              

 

             RBC MORPHOLOGY REQUIRED (test code              NORMAL             

       



             = RBCM)                                             

 

             PLATELET MORPHOLOGY REQUIRED (test              NORMAL             

       



             code = PLTMR)                                        



WBC [id number] 05:33:00





             Test Item    Value        Reference Range Interpretation Comments

 

             SEGMENTED NEUTROPHILS (test code = SEG)  %           56.5-79.4     

            

 

             LYMPHOCYTE (test code = LYMPH)  %           20-40                  

   



CBC W/AUTO [id number] 05:32:00





             Test Item    Value        Reference Range Interpretation Comments

 

             WHITE BLOOD CELL (test code = WBC) 17.9 K/mm3   6.6-12.1     H     

       

 

             RED BLOOD CELL (test code = RBC) 3.94 M/mm3   3.45-5.01    N       

     

 

             HEMOGLOBIN (test code = HGB) 12.1 g/dL    10.7-13.9    N           

 

 

             HEMATOCRIT (test code = HCT) 36.7 %       32.1-42.1    N           

 

 

             MEAN CELL VOLUME (test code = MCV) 93 fL        84.1-94.8    N     

       

 

             MEAN CELL HGB (test code = MCH) 30.7 pg      27-35        N        

    

 

             MEAN CELL HGB CONCETRATION (test 33.0 gm/dL   32.2-34.1    N       

     



             code = MCHC)                                        

 

             RED CELL DISTRIBUTION WIDTH (test 13.2 %       12.4-16.5    N      

      



             code = RDW)                                         

 

             PLATELET COUNT (test code = PLT) 214 K/mm3    133-385      N       

     

 

             MEAN PLATELET VOLUME (test code = 11.2 fl      9.1-12.7     N      

      



             MPV)                                                

 

             MANUAL DIFF REQUIRED (test code = YES                              

      



             MDIFF)                                              

 

             RBC MORPHOLOGY REQUIRED (test code              NORMAL             

       



             = RBCM)                                             

 

             PLATELET MORPHOLOGY REQUIRED (test              NORMAL             

       



             code = PLTMR)                                        



WBC [id number] 05:32:00





             Test Item    Value        Reference Range Interpretation Comments

 

             SEGMENTED NEUTROPHILS (test code = SEG)  %           56.5-79.4     

            

 

             LYMPHOCYTE (test code = LYMPH)  %           20-40                  

   



CHEMISTRY 7 [id number] 14:50:00





             Test Item    Value        Reference Range Interpretation Comments

 

             SODIUM (test code = NA) 142 mEq/L    135-145      N            

 

             POTASSIUM (test code = K) 4.6 mEq/L    3.5-5.0      N            

 

             CHLORIDE (test code = CL) 104 mEq/L    100-115      N            

 

             CARBON DIOXIDE (test code = CO2) 28 mEq/L     22-31        N       

     

 

             ANION GAP (test code = GAP) 14.50        10-20        N            

 

             GLUCOSE (test code = GLU) 81 mg/dL            N            

 

             BLOOD UREA NITROGEN (test code = 18 mg/dL     7-18         N       

     



             BUN)                                                

 

             GLOMERULAR FILTRATION RATE (test 72 ml/min    >60          N       

     



             code = GFR)                                         

 

             CREATININE (test code = CREAT) 0.8 mg/dL    0.5-1.0      N         

   

 

             CALCIUM (test code = CA) 9.1 mg/dL    8.4-10.2     N            



CBC W/AUTO [id number] 14:27:00





             Test Item    Value        Reference Range Interpretation Comments

 

             WHITE BLOOD CELL (test code = WBC) 6.4 K/mm3    6.6-12.1     L     

       

 

             RED BLOOD CELL (test code = RBC) 4.53 M/mm3   3.45-5.01    N       

     

 

             HEMOGLOBIN (test code = HGB) 13.5 g/dL    10.7-13.9    N           

 

 

             HEMATOCRIT (test code = HCT) 42.7 %       32.1-42.1    H           

 

 

             MEAN CELL VOLUME (test code = MCV) 94 fL        84.1-94.8    N     

       

 

             MEAN CELL HGB (test code = MCH) 29.8 pg      27-35        N        

    

 

             MEAN CELL HGB CONCETRATION (test 31.6 gm/dL   32.2-34.1    L       

     



             code = MCHC)                                        

 

             RED CELL DISTRIBUTION WIDTH (test 13.5 %       12.4-16.5    N      

      



             code = RDW)                                         

 

             PLATELET COUNT (test code = PLT) 249 K/mm3    133-385      N       

     

 

             IMMATURE PLATELET FRACTION (test 0.0 %        0.0-10.8     N       

     



             code = IPF)                                         

 

             MEAN PLATELET VOLUME (test code = 11.5 fl      9.1-12.7     N      

      



             MPV)                                                

 

             NEUTROPHIL % (test code = NT%) 58.1 %       56.5-79.4    N         

   

 

             LYMPHOCYTE % (test code = LY%) 30.9 %       14.3-34.3    N         

   

 

             MONOCYTE % (test code = MO%) 7.9 %        5.1-10.4     N           

 

 

             EOSINOPHIL % (test code = EO%) 1.7 %        0.1-3.0      N         

   

 

             BASOPHIL % (test code = BA%) 1.2 %        0.1-1.0      H           

 

 

             NEUTROPHIL # (test code = NT#) 3.7 K/mm3                           

   

 

             LYMPHOCYTE # (test code = LY#) 2.0 K/mm3                           

   

 

             MONOCYTE # (test code = MO#) 0.5 K/mm3                             

 

 

             EOSINOPHIL # (test code = EO#) 0.11 K/mm3                          

   

 

             BASOPHIL # (test code = BA#) 0.1 K/mm3                             

 

 

             RBC MORPHOLOGY REQUIRED (test code NORMAL       NORMAL             

       



             = RBCM)                                             

 

             PLATELET MORPHOLOGY REQUIRED (test NORMAL       NORMAL             

       



             code = PLTMR)

## 2025-02-24 ENCOUNTER — HOSPITAL ENCOUNTER (EMERGENCY)
Dept: HOSPITAL 97 - ER | Age: 70
Discharge: HOME | End: 2025-02-24
Payer: COMMERCIAL

## 2025-02-24 VITALS — DIASTOLIC BLOOD PRESSURE: 73 MMHG | SYSTOLIC BLOOD PRESSURE: 151 MMHG

## 2025-02-24 VITALS — TEMPERATURE: 98.8 F

## 2025-02-24 VITALS — OXYGEN SATURATION: 99 %

## 2025-02-24 DIAGNOSIS — Z11.52: ICD-10-CM

## 2025-02-24 DIAGNOSIS — R07.9: Primary | ICD-10-CM

## 2025-02-24 LAB
ALBUMIN SERPL BCP-MCNC: 3.2 G/DL (ref 3.4–5)
ALBUMIN/GLOB SERPL: 0.9 {RATIO} (ref 1.1–1.8)
ALP SERPL-CCNC: 69 U/L (ref 45–117)
ALT SERPL W P-5'-P-CCNC: 21 U/L (ref 13–56)
ANION GAP SERPL CALC-SCNC: 8.9 MEQ/L (ref 5–15)
AST SERPL W P-5'-P-CCNC: 19 U/L (ref 15–37)
BILIRUB INDIRECT SERPL-MCNC: 0.2 MG/DL (ref 0.2–0.8)
BUN BLD-MCNC: 20 MG/DL (ref 7–18)
GLOBULIN SER CALC-MCNC: 3.5 G/DL (ref 2.3–3.5)
GLUCOSE SERPLBLD-MCNC: 94 MG/DL (ref 74–106)
HCT VFR BLD CALC: 43.4 % (ref 36–45)
HGB BLD-MCNC: 14.3 G/DL (ref 12–15)
INR BLD: 0.98
LIPASE SERPL-CCNC: 32 U/L (ref 13–75)
LYMPHOCYTES # SPEC AUTO: 1.7 K/UL (ref 0.7–4.9)
MAGNESIUM SERPL-MCNC: 2.2 MG/DL (ref 1.6–2.4)
MCH RBC QN AUTO: 30.1 PG (ref 27–35)
MCHC RBC AUTO-ENTMCNC: 32.9 G/DL (ref 32–36)
MCV RBC: 91.4 FL (ref 80–100)
NRBC # BLD: 0 10*3/UL (ref 0–0)
NRBC BLD AUTO-RTO: 0.1 % (ref 0–0)
PMV BLD: 8.8 FL (ref 7.6–11.3)
POTASSIUM SERPL-SCNC: 3.9 MEQ/L (ref 3.5–5.1)
PROTHROMBIN TIME: 11.2 SECONDS (ref 10–13)
RBC # BLD: 4.74 M/UL (ref 3.86–4.86)
TROPONIN I SERPL HS-MCNC: < 3 PG/ML (ref ?–58.9)
WBC # BLD AUTO: 8.4 THOU/UL (ref 4.3–10.9)

## 2025-02-24 PROCEDURE — 84484 ASSAY OF TROPONIN QUANT: CPT

## 2025-02-24 PROCEDURE — 83690 ASSAY OF LIPASE: CPT

## 2025-02-24 PROCEDURE — 85610 PROTHROMBIN TIME: CPT

## 2025-02-24 PROCEDURE — 87428 SARSCOV & INF VIR A&B AG IA: CPT

## 2025-02-24 PROCEDURE — 80076 HEPATIC FUNCTION PANEL: CPT

## 2025-02-24 PROCEDURE — 99284 EMERGENCY DEPT VISIT MOD MDM: CPT

## 2025-02-24 PROCEDURE — 93005 ELECTROCARDIOGRAM TRACING: CPT

## 2025-02-24 PROCEDURE — 36415 COLL VENOUS BLD VENIPUNCTURE: CPT

## 2025-02-24 PROCEDURE — 83735 ASSAY OF MAGNESIUM: CPT

## 2025-02-24 PROCEDURE — 71045 X-RAY EXAM CHEST 1 VIEW: CPT

## 2025-02-24 PROCEDURE — 85025 COMPLETE CBC W/AUTO DIFF WBC: CPT

## 2025-02-24 PROCEDURE — 80048 BASIC METABOLIC PNL TOTAL CA: CPT

## 2025-02-24 PROCEDURE — 87070 CULTURE OTHR SPECIMN AEROBIC: CPT

## 2025-02-24 NOTE — RAD REPORT
EXAMINATION: ONE VIEW CHEST XR



CLINICAL INDICATION: CHEST PAIN



TECHNIQUE: Frontal chest projection is submitted. Examination is limited by patient positioning and t
echnique.



COMPARISON: 11/16/2019



FINDINGS:



The lungs are well inflated and clear. The heart is upper limit of normal in size. No displaced fract
ures identified.



IMPRESSION: 



No acute intrathoracic abnormalities. 







Reported By: Ander Chavez 

Electronically Signed:  2/24/2025 11:55 AM

## 2025-02-24 NOTE — ER
Nurse's Notes                                                                                     

 [facility]                                                                 

Name: Sammi Brito                                                                                 

Age: 69 yrs                                                                                       

Sex: Female                                                                                       

: 1955                                                                                   

MRN: [id number]                                                                                   

Arrival Date: 2025                                                                          

Time: 10:17                                                                                       

Account#: [id number]                                                                            

Bed 8                                                                                             

Private MD:                                                                                       

Diagnosis: Chest pain, unspecified                                                                

                                                                                                  

Presentation:                                                                                     

                                                                                             

10:27 Acuity: EDI 2                                                                           aa5 

10:27 Chief complaint: Patient states: mid-sternal chest pain and pain under left breast that aa5 

      is intermittent since yesterday. Pt also reports pain to [place] jaw and left scapula.          

      Reports high blood pressure readings at home. Coronavirus screen: At this time, the         

      client does not indicate any symptoms associated with coronavirus-19. Ebola Screen:         

      Patient denies travel to an Ebola-affected area in the 21 days before illness onset.        

      Initial Sepsis Screen: Does the patient meet any 2 criteria? No. Patient's initial          

      sepsis screen is negative. Does the patient have a suspected source of infection? No.       

      Patient's initial sepsis screen is negative. Risk Assessment: Do you want to hurt           

      yourself or someone else? Patient reports no desire to harm self or others. Onset of        

      symptoms was 2025.                                                             

10:27 Method Of Arrival: Ambulatory                                                           aa5 

                                                                                                  

Historical:                                                                                       

- Allergies:                                                                                      

10:30 PENICILLINS;                                                                            aa5 

- PMHx:                                                                                           

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 

      (Unsuccessful brain surgery 2022);                                                     

- PSHx:                                                                                           

10:30 bowel resection; total Hysterectomy; Partial removal of brain tumor 2022           aa5 

      (Unsuccessful brain surgery 2022);                                                     

                                                                                                  

- Immunization history:: Adult Immunizations unknown.                                             

- Infectious Disease History:: Denies.                                                            

- Social history:: Smoking status: Patient denies any tobacco usage or history of.                

                                                                                                  

                                                                                                  

Screening:                                                                                        

10:53 [facility] ED Fall Risk Assessment (Adult) History of falling in the last 3 months,       ll1 

      including since admission No falls in past 3 months (0 pts) Confusion or Disorientation     

      No (0 pts) Intoxicated or Sedated No (0 pts) Impaired Gait No (0 pts) Mobility Assist       

      Device Used No (0 pt) Altered Elimination No (0 pt) Score/Fall Risk Level 0 - 2 = Low       

      Risk Maintained a safe environment, Hourly rounding (assess needs \T\ fall precautionary    

      measures) done. Abuse screen: Denies threats or abuse. Nutritional screening: No            

      deficits noted. Tuberculosis screening: No symptoms or risk factors identified.             

                                                                                                  

Assessment:                                                                                       

10:34 General: Appears uncomfortable, Behavior is calm, cooperative, appropriate for age.     ll1 

      Pain: Complains of pain in chest Pain radiates to back. Cardiovascular: Reports chest       

      pain, high BP.                                                                              

10:49 Reassessment: No changes from previously documented assessment. Patient and/or family   ll1 

      updated on plan of care and expected duration. Pain level reassessed. Patient is alert,     

      oriented x 3, equal unlabored respirations, skin warm/dry/pink.                             

11:44 Reassessment: No changes from previously documented assessment. Patient and/or family   ll1 

      updated on plan of care and expected duration. Pain level reassessed. Patient is alert,     

      oriented x 3, equal unlabored respirations, skin warm/dry/pink.                             

13:00 Reassessment: No changes from previously documented assessment. Patient and/or family   ll1 

      updated on plan of care and expected duration. Pain level reassessed. Patient is alert,     

      oriented x 3, equal unlabored respirations, skin warm/dry/pink.                             

13:01 Pain: Pain began 1 day ago.                                                             ll1 

                                                                                                  

Vital Signs:                                                                                      

10:27  / 85; Pulse 75; Resp 14 S; Temp 98.8(O); Pulse Ox 99% on R/A; Weight 68.95 kg    aa5 

      (R); Height 5 ft. 3 in. (R);                                                                

10:57  / 90; Pulse 71; Resp 15; Pulse Ox 98% on R/A;                                    ll1 

12:00  / 96; Pulse 65; Pulse Ox 99% ;                                                   ll1 

13:00  / 73; Pulse 66; Resp 15; Pulse Ox 99% ; Pain 0/10;                               ll1 

10:27 Body Mass Index 26.93 (68.95 kg, 160.02 cm)                                             aa5 

13:00 Pain Scale: Adult                                                                       ll1 

                                                                                                  

ED Course:                                                                                        

10:20 Patient arrived in ED.                                                                  al6 

10:22 Ermias Obrien PA is PHCP.                                                                cp  

10:22 Jose Maria Alfred MD is Attending Physician.                                               cp  

10:27 Arm band placed on Patient placed in an exam room, on a stretcher.                      aa5 

10:33 Triage completed.                                                                       aa5 

10:33 Ashley Castellon RN is Primary Nurse.                                                     ll1 

10:33 EKG completed in triage. Results shown to MD.                                           ll1 

10:53 Initial lab(s) drawn, by me, sent to lab. Inserted saline lock: 22 gauge in right       ll1 

      antecubital area, using aseptic technique. Blood collected. Flushed with 10 mL NS.          

10:54 Patient has correct armband on for positive identification. Bed in low position.        ll1 

      Provided Education on: ER procedures and process. Cardiac monitoring not applicable on      

      this patient.                                                                               

10:54 No provider procedures requiring assistance completed. Patient maintains SpO2           ll1 

      saturation greater than 95% on room air.                                                    

11:44 COVID-19 Ag + Flu A+B Ag Sent.                                                          ll1 

11:44 Group A Streptococcus Rapid Sent.                                                       ll1 

11:44 COVID swab sent to lab. Flu and/or RSV swab sent to lab. Strep swab sent to lab.        ll1 

11:55 XRAY Chest (1 view) In Process Unspecified.                                             EDMS

13:00 IV discontinued, intact, bleeding controlled, No redness/swelling at site. Pressure     1 

      dressing applied.                                                                           

                                                                                                  

Administered Medications:                                                                         

10:57 Drug: Aspirin PO Chewable Tablet 324 mg PO once; 81 mg tablets x 4 Route: PO;           1 

13:01 Follow up: Response: No adverse reaction                                                1 

                                                                                                  

                                                                                                  

Medication:                                                                                       

10:54 VIS not applicable for this client.                                                     1 

                                                                                                  

Outcome:                                                                                          

12:53 Discharge ordered by MD.                                                                stacy  

13:01 Discharged to home ambulatory,                                                          1 

13:01 Condition: stable                                                                           

13:01 Discharge instructions given to patient, Instructed on discharge instructions, follow       

      up and referral plans. medication usage, Demonstrated understanding of instructions,        

      follow-up care, medications, Prescriptions given X 1,                                       

13:01 Patient left the ED.                                                                    1 

                                                                                                  

Signatures:                                                                                       

Dispatcher [employer]                           EDMS                                                 

Karol Grossman, RN                     RN   aa5                                                  

Ermias Obrien PA PA cp Lewis, Lynsay, RN                       RN   ll1                                                  

Adrienne Valentino                                                  

                                                                                                  

Corrections: (The following items were deleted from the chart)                                    

10:31 10:30 PSHx: Unsuccessful brain surgery 2022; aa5                                   aa5 

                                                                                                  

**************************************************************************************************

## 2025-02-24 NOTE — EDPHYS
Physician Documentation                                                                           

 [facility]                                                                 

Name: Sammi Brito                                                                                 

Age: 69 yrs                                                                                       

Sex: Female                                                                                       

: 1955                                                                                   

MRN: [id number]                                                                                   

Arrival Date: 2025                                                                          

Time: 10:17                                                                                       

Account#: [id number]                                                                            

Bed 8                                                                                             

Private MD:                                                                                       

ED Physician Jose Maria Alfred                                                                        

HPI:                                                                                              

                                                                                             

10:40 This 69 yrs old Female presents to ER via Ambulatory with complaints of Chest Pain,     cp  

      High Blood Pressure.                                                                        

10:40 The patient or guardian reports chest pain that is located primarily in the substernal  cp  

      area. Onset: yesterday, intermittent with pain returning this morning after waking up       

      at 4 am. The pain radiates to left breast area. Associated signs and symptoms:              

      Pertinent positives: nausea, Pertinent negatives: abdominal pain, cough, diaphoresis,       

      lower extremity pain, lower extremity swelling, palpitations, shortness of breath,          

      vomiting. The chest pain is described as described as indegestion. Duration: The            

      patient or guardian reports multiple episodes, that are intermittent.                       

10:40 Severity of pain: in the emergency department the pain has resolved and did so earlier  cp  

      today.                                                                                      

                                                                                                  

Historical:                                                                                       

- Allergies:                                                                                      

10:30 PENICILLINS;                                                                            aa5 

- PMHx:                                                                                           

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 

      (Unsuccessful brain surgery 2022);                                                     

- PSHx:                                                                                           

10:30 bowel resection; total Hysterectomy; Partial removal of brain tumor 2022           aa5 

      (Unsuccessful brain surgery 2022);                                                     

                                                                                                  

- Immunization history:: Adult Immunizations unknown.                                             

- Infectious Disease History:: Denies.                                                            

- Social history:: Smoking status: Patient denies any tobacco usage or history of.                

                                                                                                  

                                                                                                  

ROS:                                                                                              

10:45 Constitutional: Negative for fever,                                                     cp  

10:45 Eyes: Negative for injury, pain, redness, and discharge,                                cp  

10:45 ENT: Negative for drainage from ear(s), ear pain, sore throat, difficulty swallowing,       

      difficulty handling secretions,                                                             

10:45 Cardiovascular: Positive for chest pain, Negative for edema, palpitations,                  

10:45 Respiratory: Negative for cough, shortness of breath, wheezing,                             

10:45 Abdomen/GI: Positive for nausea, Negative for abdominal pain, vomiting, diarrhea,           

      constipation,                                                                               

10:45 Back: Negative for pain at rest, pain with movement,                                        

10:45 Neuro: Negative for altered mental status, dizziness, headache, numbness, syncope, near     

      syncope, weakness,                                                                          

10:45 All other systems are negative,                                                             

                                                                                                  

Exam:                                                                                             

10:40 ECG was reviewed by the Attending Physician.                                            cp  

10:50 Constitutional: The patient appears in no acute distress, alert, awake,                 cp  

      non-diaphoretic, non-toxic, well developed, well nourished,                                 

10:50 Head/Face:  Normocephalic, atraumatic.                                                  cp  

10:50 Eyes: Periorbital structures: appear normal, Conjunctiva: normal, no exudate, no            

      injection, Sclera: no appreciated abnormality, Lids and lashes: appear normal,              

      bilaterally,                                                                                

10:50 ENT: External ear(s): are unremarkable, Nose: is normal, Mouth: Lips: moist, Oral           

      mucosa: moist, Posterior pharynx: Airway: no evidence of obstruction, patent,               

10:50 Chest/axilla: Inspection: normal,                                                           

10:50 Cardiovascular: Rate: normal, Rhythm: regular, Edema: is not appreciated, JVD: is not       

      appreciated,                                                                                

10:50 Respiratory: the patient does not display signs of respiratory distress,  Respirations:     

      normal, no use of accessory muscles, no retractions, labored breathing, is not present,     

      Breath sounds: are clear throughout, no decreased breath sounds, no stridor, no             

      wheezing,                                                                                   

10:50 Abdomen/GI: Inspection: abdomen appears normal, Bowel sounds: active, all quadrants,        

      Palpation: abdomen is soft and non-tender, in all quadrants,                                

10:50 Back: pain, is absent, ROM is normal,                                                       

10:50 Neuro: Orientation: to person, place \T\ time. Mentation: is normal,                        

                                                                                                  

Vital Signs:                                                                                      

10:27  / 85; Pulse 75; Resp 14 S; Temp 98.8(O); Pulse Ox 99% on R/A; Weight 68.95 kg    aa5 

      (R); Height 5 ft. 3 in. (R);                                                                

10:57  / 90; Pulse 71; Resp 15; Pulse Ox 98% on R/A;                                    ll1 

12:00  / 96; Pulse 65; Pulse Ox 99% ;                                                   ll1 

13:00  / 73; Pulse 66; Resp 15; Pulse Ox 99% ; Pain 0/10;                               ll1 

10:27 Body Mass Index 26.93 (68.95 kg, 160.02 cm)                                             aa5 

13:00 Pain Scale: Adult                                                                       ll1 

                                                                                                  

MDM:                                                                                              

10:32 Medical Screening Exam initiated                                                        cp  

11:00 Differential diagnosis: abnormal EKG, acute myocardial infarction, cholecystitis,       cp  

      Cholelithiasis gastroesophageal reflux disease (GERD), pancreatitis, pericarditis,          

      pleurisy, pneumonia, pneumothorax, stable angina, unstable angina.                          

12:49 HEART Score: History: Slightly Suspicious (0), Age: > or = 65 years (2), Risk Factors:  cp  

      1 or 2 risk factors (1), [Hypercholesterolemia] Troponin: < or = 1 x Normal Limit (0).      

12:52 Data reviewed: vital signs, nurses notes, lab test result(s), EKG, radiologic studies,  cp  

      plain films.                                                                                

12:52 The patient was given aspirin in the Emergency Department. I considered the following   cp  

      discharge prescriptions or medication management in the emergency department                

      Medications were administered in the Emergency Department. See MAR. Independent             

      interpretation of the following test(s) in the Emergency Department EKG: See my EKG         

      interpretation above. Counseling: I had a detailed discussion with the patient and/or       

      guardian regarding the historical points, exam findings, and any diagnostic results         

      supporting the discharge/admit diagnosis, lab results, radiology results, the need for      

      outpatient follow up, a cardiologist, to return to the emergency department if symptoms     

      worsen or persist or if there are any questions or concerns that arise at home. Special     

      discussion: Based on the patient's history, exam, and Dx evaluation, there is no            

      indication for emergent intervention or inpatient Tx. It is understood by the               

      patient/guardian that if the Sx's persist or worsen they need to return immediately for     

      re-evaluation. ED course: Discussed negative troponin and EKG. Patient denies any pain      

      at this time and declines any further testing. Will discharge to home for continued         

      monitoring and recommend cardiology f/u.                                                    

                                                                                                  

                                                                                             

10:40 Order name: Basic Metabolic Panel; Complete Time: 12:                                   

                                                                                             

12:09 Interpretation: Normal except: BUN 20; GFR 82.                                            

                                                                                             

10:40 Order name: CBC with Diff; Complete Time: 12:                                           

                                                                                             

10:40 Order name: LFT's; Complete Time: 12:                                                   

                                                                                             

12:34 Interpretation: Normal except: ALB 3.2; A/G 0.9.                                          

                                                                                             

10:40 Order name: Magnesium; Complete Time: 12:08                                             cp  

                                                                                             

10:40 Order name: PT-INR; Complete Time: 12:08                                                cp  

                                                                                             

10:40 Order name: Troponin HS; Complete Time: 12:08                                           cp  

                                                                                             

12:34 Interpretation: Reviewed.                                                               cp  

                                                                                             

10:40 Order name: Lipase; Complete Time: 12:08                                                cp  

                                                                                             

11:17 Order name: COVID-19 Ag + Flu A+B Ag; Complete Time: 12:33                              cp  

                                                                                             

12:33 Interpretation: Reviewed.                                                               cp  

                                                                                             

11:22 Order name: Group A Streptococcus Rapid; Complete Time: 12:08                           cp  

                                                                                             

12:12 Order name: Throat Culture                                                              EDMS

                                                                                             

10:40 Order name: XRAY Chest (1 view); Complete Time: 12:08                                   cp  

                                                                                             

10:40 Order name: Cardiac monitoring; Complete Time: 10:40                                    cp  

                                                                                             

10:40 Order name: EKG - Nurse/Tech; Complete Time: 10:40                                      cp  

                                                                                             

10:40 Order name: IV Saline Lock; Complete Time: 10:49                                        cp  

                                                                                             

10:40 Order name: Labs collected and sent; Complete Time: 10:49                               cp  

                                                                                             

10:40 Order name: O2 Per Protocol; Complete Time: 10:41                                       cp  

                                                                                             

10:40 Order name: O2 Sat Monitoring; Complete Time: 10:41                                     cp  

                                                                                                  

ECG:                                                                                              

10:40 Rate is 73 beats/min. Rhythm is regular. MS interval is normal. QRS interval is normal. cp  

      QT interval is normal. T waves are Inverted in leads III, aVR. Interpreted by me.           

      Reviewed by me.                                                                             

                                                                                                  

Administered Medications:                                                                         

10:57 Drug: Aspirin PO Chewable Tablet 324 mg PO once; 81 mg tablets x 4 Route: PO;           ll1 

13:01 Follow up: Response: No adverse reaction                                                ll1 

                                                                                                  

                                                                                                  

Disposition Summary:                                                                              

25 12:53                                                                                    

Discharge Ordered                                                                                 

 Notes:       Location: Home                                                                        
  cp

      Problem: new                                                                            cp  

      Symptoms: have improved                                                                 cp  

      Condition: Stable                                                                       cp  

      Diagnosis                                                                                   

        - Chest pain, unspecified                                                             cp  

      Followup:                                                                               cp  

        - With: Private Physician                                                                  

        - When: 2 - 3 days                                                                         

        - Reason: Recheck today's complaints                                                       

      Discharge Instructions:                                                                     

        - Discharge Summary Sheet                                                             cp  

        - Nonspecific Chest Pain, Adult                                                       cp  

        - Aspirin and Your Heart                                                              cp  

      Forms:                                                                                      

        - Medication Reconciliation Form                                                      cp  

        - Antibiotic Education                                                                cp  

        - Prescription Opioid Use                                                             cp  

        - Patient Portal Instructions                                                         cp  

        - Leadership Thank You Letter                                                         cp  

      Prescriptions:                                                                              

        - Protonix 40 mg Oral Tablet                                                               

            - take 1 tablet ORAL route once daily; 30 tablet; Refills: 0, Product Selection   cp  

      Permitted                                                                                   

Addendum:                                                                                         

2025                                                                                        

     20:22 I was immediately available for consultation during this patient's visit. I did not     e
c2

           personally see the patient or discuss the patient with the SRUTHI. .                      

                                                                                                  

Signatures:                                                                                       

Dispatcher [employer]                           Karol Del Angel RN                     RN   aa5                                                  

Ermias Obrien PA PA cp Lewis, Lynsay, RN                       RN   ll1                                                  

Jose Maria Alfred MD MD   ec2                                                  

                                                                                                  

Corrections: (The following items were deleted from the chart)                                    

                                                                                             

10:31 10:30 PSHx: Unsuccessful brain surgery 2022; aa5                                   aa5 

10:40 10:40 BASIC METABOLIC PANEL+C.LAB.BRZ ordered. EDMS                                     EDMS

10:40 10:40 CBC+H.LAB.BRZ ordered. EDMS                                                       EDMS

10:40 10:40 HEPATIC FUNCTION+C.LAB.BRZ ordered. EDMS                                          EDMS

10:40 10:40 MAGNESIUM+C.LAB.BRZ ordered. EDMS                                                 EDMS

10:40 10:40 PROTIME (+INR)+COAG.LAB.BRZ ordered. EDMS                                         EDMS

10:40 10:40 Troponin High Sensitivity+C.LAB.BRZ ordered. EDMS                                 EDMS

10:40 10:40 LIPASE+C.LAB.BRZ ordered. EDMS                                                    EDMS

10:40 10:40 Chest Single View+RAD.RAD.BRZ ordered. EDMS                                       EDMS

11:18 11:18 COVID-19 Ag + Flu A+B Ag+I.LAB.BRZ ordered. EDMS                                  EDMS

                                                                                                  

**************************************************************************************************